# Patient Record
Sex: FEMALE | Race: WHITE | NOT HISPANIC OR LATINO | Employment: FULL TIME | URBAN - METROPOLITAN AREA
[De-identification: names, ages, dates, MRNs, and addresses within clinical notes are randomized per-mention and may not be internally consistent; named-entity substitution may affect disease eponyms.]

---

## 2016-06-02 LAB — EXTERNAL URINE DRUG SCREEN: NEGATIVE

## 2016-06-24 LAB — EXTERNAL CHLAMYDIA SCREEN: NORMAL

## 2017-01-05 ENCOUNTER — ROUTINE PRENATAL (OUTPATIENT)
Dept: OBSTETRICS AND GYNECOLOGY | Facility: CLINIC | Age: 22
End: 2017-01-05

## 2017-01-05 VITALS — BODY MASS INDEX: 31.79 KG/M2 | SYSTOLIC BLOOD PRESSURE: 110 MMHG | WEIGHT: 203 LBS | DIASTOLIC BLOOD PRESSURE: 72 MMHG

## 2017-01-05 DIAGNOSIS — O09.91 HIGH-RISK PREGNANCY IN FIRST TRIMESTER: Primary | ICD-10-CM

## 2017-01-05 DIAGNOSIS — E05.90 HYPERTHYROIDISM AFFECTING PREGNANCY: ICD-10-CM

## 2017-01-05 DIAGNOSIS — O99.280 HYPERTHYROIDISM AFFECTING PREGNANCY: ICD-10-CM

## 2017-01-05 PROCEDURE — 0502F SUBSEQUENT PRENATAL CARE: CPT | Performed by: OBSTETRICS & GYNECOLOGY

## 2017-01-05 RX ORDER — PSEUDOEPHEDRINE HCL 120 MG/1
120 TABLET, FILM COATED, EXTENDED RELEASE ORAL EVERY 12 HOURS
COMMUNITY
End: 2017-01-12

## 2017-01-05 NOTE — PROGRESS NOTES
Chief Complaint   Patient presents with   • Routine Prenatal Visit       HPI: Alexandria is a  currently at 36w0d who today reports the following:  Contractions - YES - but less than 4/hour AND no associated change in vaginal discharge; Leaking - No; Vaginal bleeding -  No; Swelling of extremities - No.    ROS:  Vitals: See prenatal flowsheet   GI: Nausea - No; Constipation - No; Diarrhea - No    Neuro: Headache - No; Visual change - No      EXAM:  Abdomen: See prenatal flowsheet   Urine glucose/protein: See prenatal flowsheet   Pelvic: See prenatal flowsheet   MDM:  Impression: 1. Supervision of pregnancy  2. Thyroid disease in pregnacy   Tests done today: GBS testing   Topics discussed: none - she had no major complaints,questions or concerns   Tests next visit: none   Next visit: See prenatal flowsheet

## 2017-01-05 NOTE — MR AVS SNAPSHOT
Alexandria Stock   2017 2:50 PM   Routine Prenatal    Dept Phone:  [phone]   Encounter #:  [id number]    Provider:  Pam Quezada DO   Department:  [facility] WOMEN'S CARE [place]                Your Full Care Plan              Your Updated Medication List          This list is accurate as of: 17  3:30 PM.  Always use your most recent med list.                acetaminophen 325 MG tablet   Commonly known as:  TYLENOL       ferrous sulfate 325 (65 FE) MG tablet   Take 1 tablet by mouth 2 (Two) Times a Day.       prenatal (CLASSIC) vitamin 28-0.8 MG tablet tablet       pseudoephedrine 120 MG 12 hr tablet   Commonly known as:  SUDAFED               We Performed the Following     Strep B Screen       You Were Diagnosed With        Codes Comments    High-risk pregnancy in first trimester    -  Primary ICD-10-CM: O09.91  ICD-9-CM: V23.9     Hyperthyroidism affecting pregnancy     ICD-10-CM: O99.280, E05.90  ICD-9-CM: 648.10, 242.90       Instructions     None    Patient Instructions History      Upcoming Appointments     Visit Type Date Time Department    OB FOLLOWUP 2017  2:50 PM [facility]    FOLLOW UP 3/8/2017  3:00 PM [facility] END [facility]      MyChart Signup     [facility] YouAre.TV allows you to send messages to your doctor, view your test results, renew your prescriptions, schedule appointments, and more. To sign up, go to [employer] and click on the Sign Up Now link in the New User? box. Enter your YouAre.TV Activation Code exactly as it appears below along with the last four digits of your Social Security Number and your Date of Birth () to complete the sign-up process. If you do not sign up before the expiration date, you must request a new code.    YouAre.TV Activation Code: [email or web address]  Expires: 2017  2:04 PM    If you have questions, you can email [email or web address] or call [phone] to talk to our  MyChart staff. Remember, MyChart is NOT to be used for urgent needs. For medical emergencies, dial 911.               Other Info from Your Visit           Your Appointments     Mar 08, 2017  3:00 PM EST   Follow Up with Evelin Mahmood MD   [facility] INTERNAL MEDICINE AND ENDOCRINOLOGY (--)    [address] [address]   [phone]           Arrive 15 minutes prior to appointment.              Allergies     Keflex [Cephalexin]  Hives      Reason for Visit     Routine Prenatal Visit           Vital Signs     Blood Pressure Weight Last Menstrual Period Body Mass Index Smoking Status       110/72 203 lb (92.1 kg) 04/28/2016 (Exact Date) 31.79 kg/m2 Never Smoker       Problems and Diagnoses Noted     High-risk pregnancy in first trimester    Hyperthyroidism complicating pregnancy

## 2017-01-12 ENCOUNTER — ROUTINE PRENATAL (OUTPATIENT)
Dept: OBSTETRICS AND GYNECOLOGY | Facility: CLINIC | Age: 22
End: 2017-01-12

## 2017-01-12 VITALS — DIASTOLIC BLOOD PRESSURE: 80 MMHG | BODY MASS INDEX: 31.95 KG/M2 | SYSTOLIC BLOOD PRESSURE: 120 MMHG | WEIGHT: 204 LBS

## 2017-01-12 DIAGNOSIS — O99.019 ANEMIA AFFECTING PREGNANCY: ICD-10-CM

## 2017-01-12 DIAGNOSIS — O99.280 HYPERTHYROIDISM AFFECTING PREGNANCY: ICD-10-CM

## 2017-01-12 DIAGNOSIS — F32.A DEPRESSION AFFECTING PREGNANCY IN SECOND TRIMESTER, ANTEPARTUM: ICD-10-CM

## 2017-01-12 DIAGNOSIS — O99.342 DEPRESSION AFFECTING PREGNANCY IN SECOND TRIMESTER, ANTEPARTUM: ICD-10-CM

## 2017-01-12 DIAGNOSIS — O09.91 HIGH-RISK PREGNANCY IN FIRST TRIMESTER: Primary | ICD-10-CM

## 2017-01-12 DIAGNOSIS — E05.90 HYPERTHYROIDISM AFFECTING PREGNANCY: ICD-10-CM

## 2017-01-12 PROCEDURE — 0502F SUBSEQUENT PRENATAL CARE: CPT | Performed by: OBSTETRICS & GYNECOLOGY

## 2017-01-12 NOTE — PROGRESS NOTES
Chief Complaint   Patient presents with   • Routine Prenatal Visit       HPI: Alexandria is a  currently at 37w0d who today reports the following:  Contractions - YES - but less than 4/hour AND no associated change in vaginal discharge; Leaking - No; Vaginal bleeding -  No; Swelling of extremities - No.    ROS:  Vitals: See prenatal flowsheet   GI: Nausea - No; Constipation - No; Diarrhea - No    Neuro: Headache - No; Visual change - No      EXAM:  Abdomen: See prenatal flowsheet   Urine glucose/protein: See prenatal flowsheet   Pelvic: See prenatal flowsheet   MDM:  Impression: 1. Supervision of pregnancy  2. Thyroid disease in pregnacy   Tests done today: none   Topics discussed: none - she had no major complaints,questions or concerns; she requests elective induction of labor   Tests next visit: none   Next visit: See prenatal flowsheet

## 2017-01-12 NOTE — MR AVS SNAPSHOT
Alexandria Stock   2017 2:30 PM   Routine Prenatal    Dept Phone:  [phone]   Encounter #:  [id number]    Provider:  Pam Quezada DO   Department:  [facility] WOMEN'S CARE [place]                Your Full Care Plan              Today's Medication Changes          These changes are accurate as of: 17  3:02 PM.  If you have any questions, ask your nurse or doctor.               Stop taking medication(s)listed here:     pseudoephedrine 120 MG 12 hr tablet   Commonly known as:  SUDAFED                      Your Updated Medication List          This list is accurate as of: 17  3:02 PM.  Always use your most recent med list.                acetaminophen 325 MG tablet   Commonly known as:  TYLENOL       ferrous sulfate 325 (65 FE) MG tablet   Take 1 tablet by mouth 2 (Two) Times a Day.       prenatal (CLASSIC) vitamin 28-0.8 MG tablet tablet               Instructions     None    Patient Instructions History      Upcoming Appointments     Visit Type Date Time Department    OB FOLLOWUP 2017  2:30 PM [facility]    FOLLOW UP 3/8/2017  3:00 PM [facility] END [facility]      [employer] Signup     [facility] [employer] allows you to send messages to your doctor, view your test results, renew your prescriptions, schedule appointments, and more. To sign up, go to [employer] and click on the Sign Up Now link in the New User? box. Enter your Dumbstruck Activation Code exactly as it appears below along with the last four digits of your Social Security Number and your Date of Birth () to complete the sign-up process. If you do not sign up before the expiration date, you must request a new code.    Dumb[employer]ck Activation Code: [email or web address]  Expires: 2017  3:02 PM    If you have questions, you can email [email or web address] or call [phone] to talk to our Dumbstruck staff. Remember, Dumbstruck is NOT to be used for urgent needs. For medical  emergencies, dial 911.               Other Info from Your Visit           Your Appointments     Mar 08, 2017  3:00 PM EST   Follow Up with Evelin Mahmood MD   [facility] INTERNAL MEDICINE AND ENDOCRINOLOGY (--)    [address] [place] [address] [phone]           Arrive 15 minutes prior to appointment.              Allergies     Keflex [Cephalexin]  Hives      Reason for Visit     Routine Prenatal Visit           Vital Signs     Blood Pressure Weight Last Menstrual Period Body Mass Index Smoking Status       120/80 204 lb (92.5 kg) 04/28/2016 (Exact Date) 31.95 kg/m2 Never Smoker

## 2017-01-20 ENCOUNTER — ROUTINE PRENATAL (OUTPATIENT)
Dept: OBSTETRICS AND GYNECOLOGY | Facility: CLINIC | Age: 22
End: 2017-01-20

## 2017-01-20 VITALS — BODY MASS INDEX: 32.26 KG/M2 | DIASTOLIC BLOOD PRESSURE: 74 MMHG | WEIGHT: 206 LBS | SYSTOLIC BLOOD PRESSURE: 118 MMHG

## 2017-01-20 DIAGNOSIS — F32.A DEPRESSION AFFECTING PREGNANCY IN SECOND TRIMESTER, ANTEPARTUM: ICD-10-CM

## 2017-01-20 DIAGNOSIS — O99.019 ANEMIA AFFECTING PREGNANCY: ICD-10-CM

## 2017-01-20 DIAGNOSIS — O99.342 DEPRESSION AFFECTING PREGNANCY IN SECOND TRIMESTER, ANTEPARTUM: ICD-10-CM

## 2017-01-20 DIAGNOSIS — O09.91 HIGH-RISK PREGNANCY IN FIRST TRIMESTER: Primary | ICD-10-CM

## 2017-01-20 DIAGNOSIS — O99.280 HYPERTHYROIDISM AFFECTING PREGNANCY: ICD-10-CM

## 2017-01-20 DIAGNOSIS — E05.90 HYPERTHYROIDISM AFFECTING PREGNANCY: ICD-10-CM

## 2017-01-20 PROCEDURE — 0502F SUBSEQUENT PRENATAL CARE: CPT | Performed by: OBSTETRICS & GYNECOLOGY

## 2017-01-20 RX ORDER — ONDANSETRON 4 MG/1
4 TABLET, FILM COATED ORAL EVERY 6 HOURS PRN
Status: CANCELLED | OUTPATIENT
Start: 2017-01-20

## 2017-01-20 RX ORDER — BUTORPHANOL TARTRATE 1 MG/ML
1 INJECTION, SOLUTION INTRAMUSCULAR; INTRAVENOUS
Status: CANCELLED | OUTPATIENT
Start: 2017-01-20

## 2017-01-20 RX ORDER — ONDANSETRON 2 MG/ML
4 INJECTION INTRAMUSCULAR; INTRAVENOUS EVERY 6 HOURS PRN
Status: CANCELLED | OUTPATIENT
Start: 2017-01-20

## 2017-01-20 RX ORDER — OXYTOCIN/RINGER'S LACTATE 30/500 ML
2-24 PLASTIC BAG, INJECTION (ML) INTRAVENOUS
Status: CANCELLED | OUTPATIENT
Start: 2017-01-27

## 2017-01-20 RX ORDER — OXYTOCIN/RINGER'S LACTATE 20/1000 ML
125 PLASTIC BAG, INJECTION (ML) INTRAVENOUS AS NEEDED
Status: CANCELLED | OUTPATIENT
Start: 2017-01-20 | End: 2017-01-21

## 2017-01-20 RX ORDER — PROMETHAZINE HYDROCHLORIDE 25 MG/ML
12.5 INJECTION, SOLUTION INTRAMUSCULAR; INTRAVENOUS EVERY 6 HOURS PRN
Status: CANCELLED | OUTPATIENT
Start: 2017-01-20

## 2017-01-20 RX ORDER — LIDOCAINE HYDROCHLORIDE 10 MG/ML
5 INJECTION, SOLUTION INFILTRATION; PERINEURAL AS NEEDED
Status: CANCELLED | OUTPATIENT
Start: 2017-01-20

## 2017-01-20 RX ORDER — METHYLERGONOVINE MALEATE 0.2 MG/ML
200 INJECTION INTRAVENOUS ONCE AS NEEDED
Status: CANCELLED | OUTPATIENT
Start: 2017-01-20

## 2017-01-20 RX ORDER — CARBOPROST TROMETHAMINE 250 UG/ML
250 INJECTION, SOLUTION INTRAMUSCULAR AS NEEDED
Status: CANCELLED | OUTPATIENT
Start: 2017-01-20

## 2017-01-20 RX ORDER — BUTORPHANOL TARTRATE 1 MG/ML
2 INJECTION, SOLUTION INTRAMUSCULAR; INTRAVENOUS
Status: CANCELLED | OUTPATIENT
Start: 2017-01-20

## 2017-01-20 RX ORDER — SODIUM CHLORIDE 0.9 % (FLUSH) 0.9 %
1-10 SYRINGE (ML) INJECTION AS NEEDED
Status: CANCELLED | OUTPATIENT
Start: 2017-01-20

## 2017-01-20 RX ORDER — OXYTOCIN/RINGER'S LACTATE 20/1000 ML
999 PLASTIC BAG, INJECTION (ML) INTRAVENOUS ONCE
Status: CANCELLED | OUTPATIENT
Start: 2017-01-20 | End: 2017-01-20

## 2017-01-20 RX ORDER — PROMETHAZINE HYDROCHLORIDE 25 MG/1
12.5 TABLET ORAL EVERY 6 HOURS PRN
Status: CANCELLED | OUTPATIENT
Start: 2017-01-20

## 2017-01-20 RX ORDER — TERBUTALINE SULFATE 1 MG/ML
0.25 INJECTION, SOLUTION SUBCUTANEOUS AS NEEDED
Status: CANCELLED | OUTPATIENT
Start: 2017-01-20

## 2017-01-20 RX ORDER — SODIUM CHLORIDE, SODIUM LACTATE, POTASSIUM CHLORIDE, CALCIUM CHLORIDE 600; 310; 30; 20 MG/100ML; MG/100ML; MG/100ML; MG/100ML
125 INJECTION, SOLUTION INTRAVENOUS CONTINUOUS
Status: CANCELLED | OUTPATIENT
Start: 2017-01-20

## 2017-01-20 RX ORDER — MISOPROSTOL 100 UG/1
800 TABLET ORAL AS NEEDED
Status: CANCELLED | OUTPATIENT
Start: 2017-01-20

## 2017-01-20 RX ORDER — PROMETHAZINE HYDROCHLORIDE 25 MG/1
12.5 SUPPOSITORY RECTAL EVERY 6 HOURS PRN
Status: CANCELLED | OUTPATIENT
Start: 2017-01-20

## 2017-01-20 NOTE — H&P
[place]  : 1995  MRN: [id number]  CSN: [id number]    History and Physical    Subjective   Alexandria Stock is a 21 y.o. year old  with an Estimated Date of Delivery: 17 presenting for induction of labor for elective at term per patient request and prior precipitous labor.    Risks of labor induction including prolongation of labor, increased risks for both  section and operative vaginal birth have been discussed at length.     Prenatal care has been with Dr. Quezada.  It has been complicated by hyperthyroid.    Obstetric History       T2      TAB0   SAB0   E0   M0   L2       # Outcome Date GA Lbr Eric/2nd Weight Sex Delivery Anes PTL Lv   3 Current            2 Term 04/10/14 39w0d  8 lb 6 oz (3.799 kg) M Vag-Spont   Y      Name: Trae   1 Term 08/27/10 38w0d  6 lb 2 oz (2.778 kg) F Vag-Spont   Y      Name: Jigar      Complications: Gestational hypertension        Past Medical History   Diagnosis Date   • Anxiety    • Asthma    • Depression    • Hypertension      GHTN with 1st pregnancy   • Subclinical hyperthyroidism 2016     Past Surgical History   Procedure Laterality Date   • [place] tooth extraction         Current Outpatient Prescriptions:   •  acetaminophen (TYLENOL) 325 MG tablet, Take 650 mg by mouth as needed for mild pain (1-3)., Disp: , Rfl:   •  ferrous sulfate 325 (65 FE) MG tablet, Take 1 tablet by mouth 2 (Two) Times a Day., Disp: 60 tablet, Rfl: 3  •  Prenatal Vit-Fe Fumarate-FA (PRENATAL, CLASSIC, VITAMIN) 28-0.8 MG tablet tablet, Take 1 tablet by mouth daily., Disp: , Rfl:     Allergies   Allergen Reactions   • Keflex [Cephalexin] Hives     Smoking status: Never Smoker                                                              Smokeless status: Never Used                        Review of Systems   Constitutional: Negative.    Genitourinary: Negative.    Psychiatric/Behavioral: Negative.          Objective   Visit Vitals   • /74   • Wt  206 lb (93.4 kg)   • LMP 04/28/2016 (Exact Date)   • BMI 32.26 kg/m2       General: well developed; well nourished  no acute distress  appears stated age   Abdomen: soft, NT, gravid   Cervix: 2 cm / 50 % / -2 / firm / posterior   Presentation: cephalic     Prenatal Labs  Lab Results   Component Value Date    HGB 10.1 (L) 10/21/2016    RUBELLAIGGIN Immune 06/02/2016    HEPBSAG NonReactive 06/02/2016    LABRPR Non-reactive 06/02/2016    ABORH O Rh Positive 06/02/2016    ABSCRN Negative 10/21/2016    LABANTI Negative 06/02/2016    CQOWMBI74 NonReactive 06/02/2016    YWESWNF0SI 56 (L) 10/21/2016    GBSANTIGEN Negative 01/05/2017       Current Labs Reviewed   No data reviewed       Assessment   1. IUP with an Estimated Date of Delivery: 2/2/17  2. Induction of labor because of elective at term per patient request and prior precipitous labor  3. Group B strep status: negative     Plan   1. Transcervical Nunes bulb/Pitocin induction  2. Amniotomy with descent of presenting part      Pam Quezada DO  1/20/2017  11:29 AM

## 2017-01-20 NOTE — PROGRESS NOTES
Chief Complaint   Patient presents with   • Routine Prenatal Visit       HPI: Alexandria is a  currently at 38w1d who today reports the following:  Contractions - YES - but less than 4/hour AND no associated change in vaginal discharge; Leaking - No; Vaginal bleeding -  No; Swelling of extremities - No.    ROS:  GI: Nausea - No; Constipation - No; Diarrhea - No    Neuro: Headache - No; Visual change - No      EXAM:  Vitals: See prenatal flowsheet   Abdomen: See prenatal flowsheet   Urine glucose/protein: See prenatal flowsheet   Pelvic: See prenatal flowsheet   MDM:  Impression: 1. Supervision of pregnancy  2. Thyroid disease in pregnacy   Tests done today: none   Topics discussed: 1. Continue with PNV's  2. Prenatal labs reviewed  3. induction of labor   Tests next visit: none   Next visit: See prenatal flowsheet

## 2017-01-20 NOTE — MR AVS SNAPSHOT
Alexandria Stock   2017 10:50 AM   Routine Prenatal    Dept Phone:  [phone]   Encounter #:  [id number]    Provider:  Pam Quezada DO   Department:  [facility] WOMEN'S CARE [place]                Your Full Care Plan              Your Updated Medication List          This list is accurate as of: 17 11:32 AM.  Always use your most recent med list.                acetaminophen 325 MG tablet   Commonly known as:  TYLENOL       ferrous sulfate 325 (65 FE) MG tablet   Take 1 tablet by mouth 2 (Two) Times a Day.       prenatal (CLASSIC) vitamin 28-0.8 MG tablet tablet               You Were Diagnosed With        Codes Comments    High-risk pregnancy in first trimester    -  Primary ICD-10-CM: O09.91  ICD-9-CM: V23.9     Hyperthyroidism affecting pregnancy     ICD-10-CM: O99.280, E05.90  ICD-9-CM: 648.10, 242.90     Depression affecting pregnancy in second trimester, antepartum     ICD-10-CM: O99.342, F32.9  ICD-9-CM: 648.43, 311     Anemia affecting pregnancy     ICD-10-CM: O99.019  ICD-9-CM: 648.20, 285.9       Instructions     None    Patient Instructions History      Upcoming Appointments     Visit Type Date Time Department    OB FOLLOWUP 2017 10:50 AM [facility]    LABOR INDUCTION 2017  9:30 PM  [facility] L AND D PROCED    FOLLOW UP 3/8/2017  3:00 PM [facility] END [facility]      [facility] Signup     [facility] for[MD] allows you to send messages to your doctor, view your test results, renew your prescriptions, schedule appointments, and more. To sign up, go to [employer] and click on the Sign Up Now link in the New User? box. Enter your for[MD] Activation Code exactly as it appears below along with the last four digits of your Social Security Number and your Date of Birth () to complete the sign-up process. If you do not sign up before the expiration date, you must request a new code.    for[MD] Activation Code:  [email or web address]  Expires: 1/26/2017  3:02 PM    If you have questions, you can email [email or web address] or call [phone] to talk to our [employer] staff. Remember, Whistlehart is NOT to be used for urgent needs. For medical emergencies, dial 911.               Other Info from Your Visit           Your Appointments     Jan 26, 2017  9:30 PM EST   LABOR INDUCTION with [facility]  INDUCTION ROOM 1   [facility] LABOR AND DELIVERY PROCEDURES ([place])    [address] [place] [address] [phone]            Mar 08, 2017  3:00 PM EST   Follow Up with Evelin Mahmood MD   [facility] INTERNAL MEDICINE AND ENDOCRINOLOGY (--)    [address] [place] [address] [phone]           Arrive 15 minutes prior to appointment.              Allergies     Keflex [Cephalexin]  Hives      Reason for Visit     Routine Prenatal Visit           Vital Signs     Blood Pressure Weight Last Menstrual Period Body Mass Index Smoking Status       118/74 206 lb (93.4 kg) 04/28/2016 (Exact Date) 32.26 kg/m2 Never Smoker       Problems and Diagnoses Noted     High-risk pregnancy in first trimester    Anemia affecting pregnancy    Depression affecting pregnancy in second trimester, antepartum    Hyperthyroidism complicating pregnancy

## 2017-01-23 ENCOUNTER — HOSPITAL ENCOUNTER (OUTPATIENT)
Facility: HOSPITAL | Age: 22
Setting detail: OBSERVATION
Discharge: HOME | End: 2017-01-23
Attending: OBSTETRICS & GYNECOLOGY | Admitting: OBSTETRICS & GYNECOLOGY

## 2017-01-23 ENCOUNTER — HOSPITAL ENCOUNTER (OUTPATIENT)
Facility: HOSPITAL | Age: 22
End: 2017-01-23
Attending: OBSTETRICS & GYNECOLOGY | Admitting: OBSTETRICS & GYNECOLOGY

## 2017-01-23 VITALS
DIASTOLIC BLOOD PRESSURE: 76 MMHG | WEIGHT: 206 LBS | SYSTOLIC BLOOD PRESSURE: 114 MMHG | TEMPERATURE: 98.1 F | RESPIRATION RATE: 16 BRPM | HEIGHT: 67 IN | HEART RATE: 109 BPM | BODY MASS INDEX: 32.33 KG/M2

## 2017-01-23 PROBLEM — O47.1 FALSE LABOR AFTER 37 WEEKS OF GESTATION WITHOUT DELIVERY: Status: ACTIVE | Noted: 2017-01-23

## 2017-01-23 PROBLEM — Z37.9 NORMAL LABOR: Status: ACTIVE | Noted: 2017-01-23

## 2017-01-23 PROBLEM — Z37.9 NORMAL LABOR: Status: RESOLVED | Noted: 2017-01-23 | Resolved: 2017-01-23

## 2017-01-23 LAB
ABO GROUP BLD: NORMAL
ALP SERPL-CCNC: 179 U/L (ref 25–100)
ALT SERPL W P-5'-P-CCNC: 22 U/L (ref 7–40)
AST SERPL-CCNC: 26 U/L (ref 0–33)
BACTERIA UR QL AUTO: ABNORMAL /HPF
BILIRUB SERPL-MCNC: 0.6 MG/DL (ref 0.3–1.2)
BILIRUB UR QL STRIP: ABNORMAL
BLD GP AB SCN SERPL QL: NEGATIVE
CLARITY UR: ABNORMAL
COLOR UR: ABNORMAL
CREAT BLD-MCNC: 0.6 MG/DL (ref 0.6–1.3)
DEPRECATED RDW RBC AUTO: 41.6 FL (ref 37–54)
ERYTHROCYTE [DISTWIDTH] IN BLOOD BY AUTOMATED COUNT: 13.6 % (ref 11.3–14.5)
GLUCOSE UR STRIP-MCNC: NEGATIVE MG/DL
HCT VFR BLD AUTO: 29.3 % (ref 34.5–44)
HGB BLD-MCNC: 9.6 G/DL (ref 11.5–15.5)
HGB UR QL STRIP.AUTO: NEGATIVE
HYALINE CASTS UR QL AUTO: ABNORMAL /LPF
KETONES UR QL STRIP: ABNORMAL
LDH SERPL-CCNC: 160 U/L (ref 120–246)
LEUKOCYTE ESTERASE UR QL STRIP.AUTO: ABNORMAL
MCH RBC QN AUTO: 27.1 PG (ref 27–31)
MCHC RBC AUTO-ENTMCNC: 32.8 G/DL (ref 32–36)
MCV RBC AUTO: 82.8 FL (ref 80–99)
NITRITE UR QL STRIP: NEGATIVE
PH UR STRIP.AUTO: 6.5 [PH] (ref 5–8)
PLATELET # BLD AUTO: 139 10*3/MM3 (ref 150–450)
PMV BLD AUTO: 10.3 FL (ref 6–12)
PROT UR QL STRIP: NEGATIVE
RBC # BLD AUTO: 3.54 10*6/MM3 (ref 3.89–5.14)
RBC # UR: ABNORMAL /HPF
REF LAB TEST METHOD: ABNORMAL
RH BLD: POSITIVE
SP GR UR STRIP: 1.02 (ref 1–1.03)
SQUAMOUS #/AREA URNS HPF: ABNORMAL /HPF
URATE SERPL-MCNC: 4.3 MG/DL (ref 3.1–7.8)
UROBILINOGEN UR QL STRIP: ABNORMAL
WBC NRBC COR # BLD: 8.38 10*3/MM3 (ref 3.5–10.8)
WBC UR QL AUTO: ABNORMAL /HPF

## 2017-01-23 PROCEDURE — 82565 ASSAY OF CREATININE: CPT | Performed by: OBSTETRICS & GYNECOLOGY

## 2017-01-23 PROCEDURE — 84075 ASSAY ALKALINE PHOSPHATASE: CPT | Performed by: OBSTETRICS & GYNECOLOGY

## 2017-01-23 PROCEDURE — 84550 ASSAY OF BLOOD/URIC ACID: CPT | Performed by: OBSTETRICS & GYNECOLOGY

## 2017-01-23 PROCEDURE — 86850 RBC ANTIBODY SCREEN: CPT

## 2017-01-23 PROCEDURE — 59025 FETAL NON-STRESS TEST: CPT | Performed by: OBSTETRICS & GYNECOLOGY

## 2017-01-23 PROCEDURE — 86900 BLOOD TYPING SEROLOGIC ABO: CPT

## 2017-01-23 PROCEDURE — 84450 TRANSFERASE (AST) (SGOT): CPT | Performed by: OBSTETRICS & GYNECOLOGY

## 2017-01-23 PROCEDURE — G0378 HOSPITAL OBSERVATION PER HR: HCPCS

## 2017-01-23 PROCEDURE — 99235 HOSP IP/OBS SAME DATE MOD 70: CPT | Performed by: OBSTETRICS & GYNECOLOGY

## 2017-01-23 PROCEDURE — 99218 PR INITIAL OBSERVATION CARE/DAY 30 MINUTES: CPT | Performed by: OBSTETRICS & GYNECOLOGY

## 2017-01-23 PROCEDURE — 82247 BILIRUBIN TOTAL: CPT | Performed by: OBSTETRICS & GYNECOLOGY

## 2017-01-23 PROCEDURE — 85027 COMPLETE CBC AUTOMATED: CPT | Performed by: OBSTETRICS & GYNECOLOGY

## 2017-01-23 PROCEDURE — 63710000001 PROMETHAZINE PER 25 MG: Performed by: OBSTETRICS & GYNECOLOGY

## 2017-01-23 PROCEDURE — 81001 URINALYSIS AUTO W/SCOPE: CPT | Performed by: OBSTETRICS & GYNECOLOGY

## 2017-01-23 PROCEDURE — 96372 THER/PROPH/DIAG INJ SC/IM: CPT

## 2017-01-23 PROCEDURE — 86901 BLOOD TYPING SEROLOGIC RH(D): CPT

## 2017-01-23 PROCEDURE — 84460 ALANINE AMINO (ALT) (SGPT): CPT | Performed by: OBSTETRICS & GYNECOLOGY

## 2017-01-23 PROCEDURE — 83615 LACTATE (LD) (LDH) ENZYME: CPT | Performed by: OBSTETRICS & GYNECOLOGY

## 2017-01-23 PROCEDURE — 25010000002 MORPHINE PER 10 MG: Performed by: OBSTETRICS & GYNECOLOGY

## 2017-01-23 RX ORDER — SODIUM PHOSPHATE, DIBASIC AND SODIUM PHOSPHATE, MONOBASIC 7; 19 G/133ML; G/133ML
1 ENEMA RECTAL ONCE
Status: COMPLETED | OUTPATIENT
Start: 2017-01-23 | End: 2017-01-23

## 2017-01-23 RX ORDER — MORPHINE SULFATE 4 MG/ML
10 INJECTION, SOLUTION INTRAMUSCULAR; INTRAVENOUS ONCE
Status: COMPLETED | OUTPATIENT
Start: 2017-01-23 | End: 2017-01-23

## 2017-01-23 RX ORDER — PROMETHAZINE HYDROCHLORIDE 25 MG/1
25 TABLET ORAL ONCE
Status: COMPLETED | OUTPATIENT
Start: 2017-01-23 | End: 2017-01-23

## 2017-01-23 RX ADMIN — SODIUM PHOSPHATE, DIBASIC AND SODIUM PHOSPHATE, MONOBASIC 1 ENEMA: 7; 19 ENEMA RECTAL at 10:45

## 2017-01-23 RX ADMIN — PROMETHAZINE HYDROCHLORIDE 25 MG: 25 TABLET ORAL at 10:44

## 2017-01-23 RX ADMIN — MORPHINE SULFATE 10 MG: 4 INJECTION INTRAVENOUS at 10:45

## 2017-01-23 RX ADMIN — SODIUM CITRATE AND CITRIC ACID MONOHYDRATE 30 ML: 500; 334 SOLUTION ORAL at 08:29

## 2017-01-23 NOTE — NURSING NOTE
D/c instructions reviewed with pt and pt vu and in agreement with poc. Pt d.c home in private vehicle with FOB. Pt to keep f/u appt.

## 2017-01-23 NOTE — IP AVS SNAPSHOT
AFTER VISIT SUMMARY             Alexandria Stock           About your hospitalization     You were discharged on:  January 23, 2017 You last received care in the:  [facility] LABOR DELIVERY     Unit phone number:  [phone]       Medications    If you or your caregiver advised us that you are currently taking a medication and that medication is marked below as “Resume”, this simply indicates that we have reviewed those medications to make sure our new therapy recommendations do not interfere.  If you have concerns about medications other than those new ones which we are prescribing today, please consult the physician who prescribed them (or your primary physician).  Our review of your home medications is not meant to indicate that we are directing their use.             Your Medications      ASK your doctor about these medications           Morning Noon Evening Bedtime As Needed    acetaminophen 325 MG tablet   Take 650 mg by mouth as needed for mild pain (1-3).   Commonly known as:  TYLENOL                                ferrous sulfate 325 (65 FE) MG tablet   Take 1 tablet by mouth 2 (Two) Times a Day.                                prenatal (CLASSIC) vitamin 28-0.8 MG tablet tablet   Take 1 tablet by mouth daily.                                         Instructions for After Discharge        Discharge References/Attachments     REJI LOPEZ CONTRACTIONS (ENGLISH)    NONSTRESS TEST (ENGLISH)    CONSTIPATION, ADULT, EASY-TO-READ (ENGLISH)      Scheduled Appointments     Jan 26, 2017  9:30 PM EST   LABOR INDUCTION with TOMY  INDUCTION ROOM 1   [facility] LABOR AND DELIVERY PROCEDURES ([place])    [address] [place] [address] [phone]            Mar 08, 2017  3:00 PM EST   Follow Up with Evelin Mahmood MD   [facility]OCRINOLOGY (--)    [address] [place] [id number] [phone]           Arrive 15 minutes prior to appointment.               Relevant Prenatal Information          Facts About Your Prenatal Visit (All Dating Information Is Approximate)     Due Date How Far Along Am I? Pregnancy Weight Gain Weight Gain Since Prior Visit (2017)    2017 38 weeks 4 days 32 lb (14.5 kg) 0 lb (0 kg)      Vitals     Blood Pressure Weight                114/76 206 lb (93.4 kg)           Summary of Your Hospitalization        Reason for Hospitalization     Your primary diagnosis was:  Not on File    Your diagnoses also included:  Normal Labor      Care Providers     Provider Service Role Specialty    Pam Quezada DO -- Attending Provider Obstetrics and Gynecology      Your Allergies  Date Reviewed: 2017    Allergen Reactions    Keflex (Cephalexin) Hives      [employer] Signup     [facility] [employer] allows you to send messages to your doctor, view your test results, renew your prescriptions, schedule appointments, and more. To sign up, go to [employer] and click on the Sign Up Now link in the New User? box. Enter your [employer] Activation Code exactly as it appears below along with the last four digits of your Social Security Number and your Date of Birth () to complete the sign-up process. If you do not sign up before the expiration date, you must request a new code.    [employer] Activation Code: [email or web address]  Expires: 2017  3:02 PM    If you have questions, you can email [email or web address] or call [phone] to talk to our [employer] staff. Remember, [employer] is NOT to be used for urgent needs. For medical emergencies, dial 911.          Information Regarding Fetal Kick Counts     Fetal Movement Counts  Patient Name: __________________________________________________   Patient Due Date: ____________________  Performing a fetal movement count is highly recommended in high-risk pregnancies, but it is good for every pregnant woman to do. Your caregiver may  ask you to start counting fetal movements at 28 weeks of the pregnancy. Fetal movements often increase:  · After eating a full meal.  · After physical activity.  · After eating or drinking something sweet or cold.  · At rest.  Pay attention to when you feel the baby is most active. This will help you notice a pattern of your baby's sleep and wake cycles and what factors contribute to an increase in fetal movement. It is important to perform a fetal movement count at the same time each day when your baby is normally most active.    HOW TO COUNT FETAL MOVEMENTS  1. Find a quiet and comfortable area to sit or lie down on your left side. Lying on your left side provides the best blood and oxygen circulation to your baby.  2. Write down the day and time on a sheet of paper or in a journal.  3. Start counting kicks, flutters, swishes, rolls, or jabs in a 2 hour period. You should feel at least 10 movements within 2 hours.  4. If you do not feel 10 movements in 2 hours, wait 2-3 hours and count again. Look for a change in the pattern or not enough counts in 2 hours.  SEEK MEDICAL CARE IF:  · You feel less than 10 counts in 2 hours, tried twice.  · There is no movement in over an hour.  · The pattern is changing or taking longer each day to reach 10 counts in 2 hours.  · You feel the baby is not moving as he or she usually does.    Date  Movements  Start Time  Finish Time    Date  Movements  Start Time  Finish Time    Date  Movements  Start Time  Finish Time    Date  Movements  Start Time  Finish Time    Date  Movements  Start Time  Finish Time    Date  Movements  Start Time  Finish Time      Date  Movements  Start Time  Finish Time    Date  Movements  Start Time  Finish Time    Date  Movements  Start Time  Finish Time    Date  Movements  Start Time  Finish Time    Date  Movements  Start Time  Finish Time    Date  Movements  Start Time  Finish Time      Date  Movements  Start Time  Finish Time    Date  Movements  Start Time   Finish Time    Date  Movements  Start Time  Finish Time    Date  Movements  Start Time  Finish Time    Date  Movements  Start Time  Finish Time    Date  Movements  Start Time  Finish Time    Date  Movements  Start Time  Finish Time    Date  Movements  Start Time  Finish Time    Date  Movements  Start Time  Finish Time    Date  Movements  Start Time  Finish Time    Date  Movements  Start Time  Finish Time    Date  Movements  Start Time  Finish Time      Document Released: 01/17/2008   Document Revised: 12/04/2013   Document Reviewed: 10/14/2013    ExitCare® Patient Information ©2015 [employer], [facility]. This information is not intended to replace advice given to you by your health care provider. Make sure you discuss any questions you have with your health care provider.            More Information      Reji Herrera Contractions  Contractions of the uterus can occur throughout pregnancy. Contractions are not always a sign that you are in labor.   WHAT ARE REJI HERRERA CONTRACTIONS?   Contractions that occur before labor are called Reji Herrera contractions, or false labor. Toward the end of pregnancy (32-34 weeks), these contractions can develop more often and may become more forceful. This is not true labor because these contractions do not result in opening (dilatation) and thinning of the cervix. They are sometimes difficult to tell apart from true labor because these contractions can be forceful and people have different pain tolerances. You should not feel embarrassed if you go to the hospital with false labor. Sometimes, the only way to tell if you are in true labor is for your health care provider to look for changes in the cervix.  If there are no prenatal problems or other health problems associated with the pregnancy, it is completely safe to be sent home with false labor and await the onset of true labor.  HOW CAN YOU TELL THE DIFFERENCE BETWEEN TRUE AND FALSE LABOR?  False Labor  · The contractions of false  labor are usually shorter and not as hard as those of true labor.    · The contractions are usually irregular.    · The contractions are often felt in the front of the lower abdomen and in the groin.    · The contractions may go away when you walk around or change positions while lying down.    · The contractions get weaker and are shorter lasting as time goes on.    · The contractions do not usually become progressively stronger, regular, and closer together as with true labor.    True Labor  · Contractions in true labor last 30-70 seconds, become very regular, usually become more intense, and increase in frequency.    · The contractions do not go away with walking.    · The discomfort is usually felt in the top of the uterus and spreads to the lower abdomen and low back.    · True labor can be determined by your health care provider with an exam. This will show that the cervix is dilating and getting thinner.    WHAT TO REMEMBER  · Keep up with your usual exercises and follow other instructions given by your health care provider.    · Take medicines as directed by your health care provider.    · Keep your regular prenatal appointments.    · Eat and drink lightly if you think you are going into labor.    · If Harnett Herrera contractions are making you uncomfortable:      Change your position from lying down or resting to walking, or from walking to resting.      Sit and rest in a tub of warm water.      Drink 2-3 glasses of water. Dehydration may cause these contractions.      Do slow and deep breathing several times an hour.    WHEN SHOULD I SEEK IMMEDIATE MEDICAL CARE?  Seek immediate medical care if:  · Your contractions become stronger, more regular, and closer together.    · You have fluid leaking or gushing from your vagina.    · You have a fever.    · You pass blood-tinged mucus.    · You have vaginal bleeding.    · You have continuous abdominal pain.    · You have low back pain that you never had before.     · You feel your baby's head pushing down and causing pelvic pressure.    · Your baby is not moving as much as it used to.       This information is not intended to replace advice given to you by your health care provider. Make sure you discuss any questions you have with your health care provider.     Document Released: 12/18/2006 Document Revised: 12/23/2014 Document Reviewed: 09/29/2014  [employer] Interactive Patient Education ©2016 [employer]          Nonstress Test  The nonstress test is a procedure that monitors the fetus's heartbeat. The test will monitor the heartbeat when the fetus is at rest and while the fetus is moving. In a healthy fetus, there will be an increase in fetal heart rate when the fetus moves or kicks. The heart rate will decrease at rest. This test helps determine if the fetus is healthy. Your health care provider will look at a number of patterns in the heart rate tracing to make sure your baby is thriving. If there is concern, your health care provider may order additional tests or may suggest another course of action. This test is often done in the third trimester and can help determine if an early delivery is needed and safe. Common reasons to have this test are:  · You are past your due date.  · You have a high-risk pregnancy.  · You are feeling less movement than normal.  · You have lost a pregnancy in the past.  · Your health care provider suspects fetal growth problems.  · You have too much or too little amniotic fluid.  BEFORE THE PROCEDURE  · Eat a meal right before the test or as directed by your health care provider. Food may help stimulate fetal movements.  · Use the restroom right before the test.  PROCEDURE  · Two belts will be placed around your abdomen. These belts have monitors attached to them. One records the fetal heart rate and the other records uterine contractions.  · You may be asked to lie down on your side or to stay sitting upright.  · You may be given a  button to press when you feel movement.  · The fetal heartbeat is listened to and watched on a screen. The heartbeat is recorded on a sheet of paper.  · If the fetus seems to be sleeping, you may be asked to drink some juice or soda, gently press your abdomen, or make some noise to wake the fetus.  AFTER THE PROCEDURE   Your health care provider will discuss the test results with you and make recommendations for the near future.     This information is not intended to replace advice given to you by your health care provider. Make sure you discuss any questions you have with your health care provider.     Document Released: 12/08/2003 Document Revised: 01/08/2016 Document Reviewed: 01/21/2014  [employer] Interactive Patient Education ©2016 [employer]          Constipation, Adult  Constipation is when a person:  · Poops (has a bowel movement) less than 3 times a week.  · Has a hard time pooping.  · Has poop that is dry, hard, or bigger than normal.  HOME CARE   · Eat foods with a lot of fiber in them. This includes fruits, vegetables, beans, and whole grains such as brown rice.  · Avoid fatty foods and foods with a lot of sugar. This includes french fries, hamburgers, cookies, candy, and soda.  · If you are not getting enough fiber from food, take products with added fiber in them (supplements).  · Drink enough fluid to keep your pee (urine) clear or pale yellow.  · Exercise on a regular basis, or as told by your doctor.  · Go to the restroom when you feel like you need to poop. Do not hold it.  · Only take medicine as told by your doctor. Do not take medicines that help you poop (laxatives) without talking to your doctor first.  GET HELP RIGHT AWAY IF:   · You have bright red blood in your poop (stool).  · Your constipation lasts more than 4 days or gets worse.  · You have belly (abdominal) or butt (rectal) pain.  · You have thin poop (as thin as a pencil).  · You lose weight, and it cannot be explained.  MAKE SURE  YOU:   · Understand these instructions.  · Will watch your condition.  · Will get help right away if you are not doing well or get worse.     This information is not intended to replace advice given to you by your health care provider. Make sure you discuss any questions you have with your health care provider.     Document Released: 06/05/2009 Document Revised: 01/08/2016 Document Reviewed: 09/29/2014  [employer] Interactive Patient Education ©2016 [employer] Inc.            SYMPTOMS OF A STROKE    Call 911 or have someone take you to the Emergency Department if you have any of the following:    · Sudden numbness or weakness of your face, arm or leg especially on one side of the body  · Sudden confusion, diffiiculty speaking or trouble understanding   · Changes in your vision or loss of sight in one eye  · Sudden severe headache with no known cause  · sudden dizziness, trouble walking, loss of balance or coordination    It is important to seek emergency care right away if you have further stroke symptoms. If you get emergency help quickly, the powerful clot-dissolving medicines can reduce the disabilities caused by a stroke.     For more information:    American Stroke Association  0-147-2-STROKE  www.strokeassociation.org           IF YOU SMOKE OR USE TOBACCO PLEASE READ THE FOLLOWING:    Why is smoking bad for me?  Smoking increases the risk of heart disease, lung disease, vascular disease, stroke, and cancer.     If you smoke, STOP!    If you would like more information on quitting smoking, please visit the [employer] website: [email or web address]-[email or web address]   This link will provide additional resources including the QUIT line and the [employer] support groups.     For more information:    American Cancer Society  (625) 515-4114    American Heart Association  1-468.403.5011               YOU ARE THE MOST IMPORTANT FACTOR IN YOUR RECOVERY.     Follow all instructions carefully.      I have reviewed my discharge instructions with my nurse, including the following information, if applicable:     Information about my illness and diagnosis   Follow up appointments (including lab draws)   Wound Care   Equipment Needs   Medications (new and continuing) along with side effects   Preventative information such as vaccines and smoking cessations   Diet   Pain   I know when to contact my Doctor's office or seek emergency care      I want my nurse to describe the side effects of my medications: YES NO   If the answer is no, I understand the side effects of my medications: YES NO   My nurse described the side effects of my medications in a way that I could understand: YES NO   I have taken my personal belongings and my own medications with me at discharge: YES NO            Should a home visit be schedule with you:  a notification from your provider will be made prior to the visit.  If you have any questions or concerns about the visit, contact your provider.      I have received this information and my questions have been answered. I have discussed any concerns I see with this plan with the nurse or physician. I understand these instructions.    Signature of Patient or Responsible Person: _____________________________________    Date: _________________  Time: __________________    Signature of Healthcare Provider: _______________________________________  Date: _________________  Time: __________________          Additional Information     I have reviewed the patient condition and status with the provider and a discharge order was obtained.  I hereby state that the patient has been examined and observed for a reasonable period of time and I certify that the patient is in false labor.    RN Name ___________________________________________    Date and Time _______________________________________

## 2017-01-23 NOTE — H&P
"Alexandria Stock  1995  [id number]  [id number]    CC: contractions  HPI:  Patient is 21 y.o. white female   currently at 38w4d  Presents with c/o uterine contractions.  Onset ~8pm, intermittent, rates 7/10, radiates to back, denies assoc bleeding or SROM.  Good FM.  PNC comp by hx hyperthyroisism (was taking methimazole but was discontinued by endocrinologist ~2 months ago).      PMH:   Current meds PNV, Fe, albuterol inhaler (hasn't needed to use in a long time)  Illnesses asthma, hyperthyroisism  Surgeries oral surg  Allergies keflex-hives    Past OB History:       Obstetric History       T2      TAB0   SAB0   E0   M0   L2       # Outcome Date GA Lbr Eric/2nd Weight Sex Delivery Anes PTL Lv   3 Current            2 Term 04/10/14 39w0d  8 lb 6 oz (3.799 kg) M Vag-Spont   Y      Name: Trae Magana Term 08/27/10 38w0d  6 lb 2 oz (2.778 kg) F Vag-Spont   Y      Name: Jigar      Complications: Gestational hypertension               SH: tob neg , EtOH neg, drugs neg  FH: heart dz pos , diabetes pos , cancer pos    General ROS: All systems reviewed and negative except for N,dysuria,tingling (legs), edema      Physical Examination: General appearance - alert, well appearing, and in no distress  Vital signs -   Visit Vitals   • /69   • Pulse 109   • Temp 98.4 °F (36.9 °C) (Oral)   • Resp 18   • Ht 67\" (170.2 cm)   • Wt 206 lb (93.4 kg)   • LMP 2016 (Exact Date)   • BMI 32.26 kg/m2     HEENT: normocephalic, atraumatic, pharynx clear   Neck - supple, no significant adenopathy  Lymphatics - no palpable lymphadenopathy, no hepatosplenomegaly  Chest - clear to auscultation, no wheezes, rales or rhonchi, symmetric air entry  Heart - normal rate, regular rhythm, normal S1, S2, no murmurs, rubs, clicks or gallops, no JVD  Abdomen - soft, nontender, nondistended, no masses or organomegaly  no rebound tenderness noted, non-distended  bowel sounds normal  Vaginal Exam: tight 3/70%/-2, no blood in " "vault, large amt stool in rectum  Extremities - tr pedal edema noted, no calf tend  Skin - normal coloration and turgor, no rashes, no suspicious skin lesions noted      Labs:    Results from last 7 days  Lab Units 01/23/17  0611   WBC 10*3/mm3 8.38   HEMOGLOBIN g/dL 9.6*   HEMATOCRIT % 29.3*   PLATELETS 10*3/mm3 139*       Results from last 7 days  Lab Units 01/23/17  0611   CREATININE mg/dL 0.60   BILIRUBIN mg/dL 0.6   ALK PHOS U/L 179*   ALT (SGPT) U/L 22   AST (SGOT) U/L 26         Fetal monitoring: indication contractions , onset 0656 , offset 0800 , baseline 145 , mod BTB variability , multiple accels (15 X 15), no decels, q2-5\" contractions, interpretation reactive NST    Radiology     Assessment 1)IUP 38 4/7 weeks     2)prob early labor     3)poss UTI    Plan 1)observe     2)discussed with Dr. Pilar Cassidy MD  1/23/2017  7:58 AM                                                                    "

## 2017-01-23 NOTE — PROGRESS NOTES
Alexandria Stock  1995        Alexandria Stock states her pain is controlled, her contractions are now about every 5-7 minutes (were 3-5 minutes apart when she came to the hospital). She denies vb/LOF. She admits to active fetal movement.  Vitals:    17 0554 17 0607 17 0623 17 0723   BP: 123/79 123/72 116/69 114/76   BP Location:    Right arm   Patient Position:    Lying   Pulse: (!) 127 112 109 109   Resp:    16   Temp:    98.1 °F (36.7 °C)   TempSrc:    Oral   Weight:       Height:             General Appearance:  awake, alert, oriented, in no acute distress and well developed, well nourished    Cervix: 2-3/50%/Floating/posterior/soft/cephalic    Baseline: 135 bpm, Variability: moderate, Accelerations: Reactive and Decelerations: Absent Category 1  irregular, every 3-7 minutes    Impression: 21 y.o.  at 38w4d                              false labor vs early latent labor                              Category 1                             GBS Negative  Plan: Therapeutic rest. D/C home. Pt to return when painful ctx q3-5 minutes or for ROM or vb. Discussed with pt.    Amberly Quezada DO

## 2017-01-26 ENCOUNTER — APPOINTMENT (OUTPATIENT)
Dept: LABOR AND DELIVERY | Facility: HOSPITAL | Age: 22
End: 2017-01-26

## 2017-01-27 ENCOUNTER — ANESTHESIA (OUTPATIENT)
Dept: LABOR AND DELIVERY | Facility: HOSPITAL | Age: 22
End: 2017-01-27

## 2017-01-27 ENCOUNTER — ANESTHESIA EVENT (OUTPATIENT)
Dept: LABOR AND DELIVERY | Facility: HOSPITAL | Age: 22
End: 2017-01-27

## 2017-01-27 ENCOUNTER — HOSPITAL ENCOUNTER (INPATIENT)
Dept: LABOR AND DELIVERY | Facility: HOSPITAL | Age: 22
LOS: 2 days | Discharge: HOME | End: 2017-01-29
Attending: OBSTETRICS & GYNECOLOGY | Admitting: OBSTETRICS & GYNECOLOGY

## 2017-01-27 DIAGNOSIS — O09.91 HIGH-RISK PREGNANCY IN FIRST TRIMESTER: ICD-10-CM

## 2017-01-27 PROBLEM — Z34.90 TERM PREGNANCY: Status: RESOLVED | Noted: 2017-01-27 | Resolved: 2017-01-27

## 2017-01-27 PROBLEM — O47.1 FALSE LABOR AFTER 37 WEEKS OF GESTATION WITHOUT DELIVERY: Status: RESOLVED | Noted: 2017-01-23 | Resolved: 2017-01-27

## 2017-01-27 PROBLEM — Z34.90 TERM PREGNANCY: Status: ACTIVE | Noted: 2017-01-27

## 2017-01-27 LAB
ABO GROUP BLD: NORMAL
ATMOSPHERIC PRESS: ABNORMAL MMHG
ATMOSPHERIC PRESS: ABNORMAL MMHG
BASE EXCESS BLDCOA CALC-SCNC: -5.3 MMOL/L (ref 0–2)
BASE EXCESS BLDCOV CALC-SCNC: -5.5 MMOL/L (ref 0–2)
BDY SITE: ABNORMAL
BLD GP AB SCN SERPL QL: NEGATIVE
CO2 BLDA-SCNC: 19.7 MMOL/L (ref 22–33)
CO2 BLDA-SCNC: 24.3 MMOL/L (ref 22–33)
DEPRECATED RDW RBC AUTO: 42.1 FL (ref 37–54)
ERYTHROCYTE [DISTWIDTH] IN BLOOD BY AUTOMATED COUNT: 13.9 % (ref 11.3–14.5)
HCO3 BLDCOA-SCNC: 22.6 MMOL/L (ref 16.9–20.5)
HCO3 BLDCOV-SCNC: 18.8 MMOL/L (ref 18.6–21.4)
HCT VFR BLD AUTO: 29.1 % (ref 34.5–44)
HGB BLD-MCNC: 9.4 G/DL (ref 11.5–15.5)
HGB BLDA-MCNC: 16.5 G/DL (ref 14–18)
HGB BLDA-MCNC: 17.4 G/DL (ref 14–18)
HOROWITZ INDEX BLD+IHG-RTO: 21 %
HOROWITZ INDEX BLD+IHG-RTO: 21 %
MCH RBC QN AUTO: 26.9 PG (ref 27–31)
MCHC RBC AUTO-ENTMCNC: 32.3 G/DL (ref 32–36)
MCV RBC AUTO: 83.4 FL (ref 80–99)
MODALITY: ABNORMAL
MODALITY: ABNORMAL
PCO2 BLDCOA: 54.6 MMHG (ref 43.3–54.9)
PCO2 BLDCOV: 31.4 MM HG (ref 30–60)
PH BLDCOA: 7.23 [PH] (ref 7.2–7.3)
PH BLDCOV: 7.39 [PH] (ref 7.19–7.46)
PLATELET # BLD AUTO: 148 10*3/MM3 (ref 150–450)
PMV BLD AUTO: 10.9 FL (ref 6–12)
PO2 BLDCOA: 24.6 MMHG (ref 11.5–43.3)
PO2 BLDCOV: 36.5 MM HG
RBC # BLD AUTO: 3.49 10*6/MM3 (ref 3.89–5.14)
RH BLD: POSITIVE
SAO2 % BLDCOA: 42.9 %
SAO2 % BLDCOA: ABNORMAL % (ref 45–75)
SAO2 % BLDCOV: 79.8 %
WBC NRBC COR # BLD: 7.08 10*3/MM3 (ref 3.5–10.8)

## 2017-01-27 PROCEDURE — 86850 RBC ANTIBODY SCREEN: CPT

## 2017-01-27 PROCEDURE — 25010000002 ROPIVACAINE PER 1 MG: Performed by: NURSE ANESTHETIST, CERTIFIED REGISTERED

## 2017-01-27 PROCEDURE — 86900 BLOOD TYPING SEROLOGIC ABO: CPT

## 2017-01-27 PROCEDURE — 86901 BLOOD TYPING SEROLOGIC RH(D): CPT

## 2017-01-27 PROCEDURE — 82805 BLOOD GASES W/O2 SATURATION: CPT | Performed by: OBSTETRICS & GYNECOLOGY

## 2017-01-27 PROCEDURE — 25010000002 FENTANYL CITRATE (PF) 100 MCG/2ML SOLUTION: Performed by: NURSE ANESTHETIST, CERTIFIED REGISTERED

## 2017-01-27 PROCEDURE — 10907ZC DRAINAGE OF AMNIOTIC FLUID, THERAPEUTIC FROM PRODUCTS OF CONCEPTION, VIA NATURAL OR ARTIFICIAL OPENING: ICD-10-PCS | Performed by: OBSTETRICS & GYNECOLOGY

## 2017-01-27 PROCEDURE — 3E033VJ INTRODUCTION OF OTHER HORMONE INTO PERIPHERAL VEIN, PERCUTANEOUS APPROACH: ICD-10-PCS | Performed by: OBSTETRICS & GYNECOLOGY

## 2017-01-27 PROCEDURE — 59400 OBSTETRICAL CARE: CPT | Performed by: OBSTETRICS & GYNECOLOGY

## 2017-01-27 PROCEDURE — 59025 FETAL NON-STRESS TEST: CPT

## 2017-01-27 PROCEDURE — 85027 COMPLETE CBC AUTOMATED: CPT | Performed by: OBSTETRICS & GYNECOLOGY

## 2017-01-27 PROCEDURE — C1755 CATHETER, INTRASPINAL: HCPCS

## 2017-01-27 RX ORDER — MISOPROSTOL 200 UG/1
800 TABLET ORAL AS NEEDED
Status: DISCONTINUED | OUTPATIENT
Start: 2017-01-27 | End: 2017-01-27 | Stop reason: HOSPADM

## 2017-01-27 RX ORDER — FERROUS SULFATE 325(65) MG
325 TABLET ORAL 2 TIMES DAILY WITH MEALS
Status: DISCONTINUED | OUTPATIENT
Start: 2017-01-27 | End: 2017-01-29 | Stop reason: HOSPADM

## 2017-01-27 RX ORDER — LIDOCAINE HYDROCHLORIDE AND EPINEPHRINE 20; 5 MG/ML; UG/ML
INJECTION, SOLUTION EPIDURAL; INFILTRATION; INTRACAUDAL; PERINEURAL AS NEEDED
Status: DISCONTINUED | OUTPATIENT
Start: 2017-01-27 | End: 2017-01-27 | Stop reason: SURG

## 2017-01-27 RX ORDER — METOCLOPRAMIDE HYDROCHLORIDE 5 MG/ML
10 INJECTION INTRAMUSCULAR; INTRAVENOUS ONCE AS NEEDED
Status: DISCONTINUED | OUTPATIENT
Start: 2017-01-27 | End: 2017-01-27 | Stop reason: HOSPADM

## 2017-01-27 RX ORDER — SODIUM CHLORIDE 0.9 % (FLUSH) 0.9 %
1-10 SYRINGE (ML) INJECTION AS NEEDED
Status: DISCONTINUED | OUTPATIENT
Start: 2017-01-27 | End: 2017-01-29 | Stop reason: HOSPADM

## 2017-01-27 RX ORDER — DIPHENHYDRAMINE HYDROCHLORIDE 50 MG/ML
12.5 INJECTION INTRAMUSCULAR; INTRAVENOUS EVERY 8 HOURS PRN
Status: DISCONTINUED | OUTPATIENT
Start: 2017-01-27 | End: 2017-01-27 | Stop reason: HOSPADM

## 2017-01-27 RX ORDER — ONDANSETRON 2 MG/ML
4 INJECTION INTRAMUSCULAR; INTRAVENOUS EVERY 6 HOURS PRN
Status: DISCONTINUED | OUTPATIENT
Start: 2017-01-27 | End: 2017-01-27 | Stop reason: HOSPADM

## 2017-01-27 RX ORDER — DOCUSATE SODIUM 100 MG/1
100 CAPSULE, LIQUID FILLED ORAL 2 TIMES DAILY
Status: DISCONTINUED | OUTPATIENT
Start: 2017-01-27 | End: 2017-01-29 | Stop reason: HOSPADM

## 2017-01-27 RX ORDER — LIDOCAINE HYDROCHLORIDE AND EPINEPHRINE 15; 5 MG/ML; UG/ML
INJECTION, SOLUTION EPIDURAL AS NEEDED
Status: DISCONTINUED | OUTPATIENT
Start: 2017-01-27 | End: 2017-01-27 | Stop reason: SURG

## 2017-01-27 RX ORDER — LIDOCAINE HYDROCHLORIDE 10 MG/ML
5 INJECTION, SOLUTION INFILTRATION; PERINEURAL AS NEEDED
Status: DISCONTINUED | OUTPATIENT
Start: 2017-01-27 | End: 2017-01-27 | Stop reason: HOSPADM

## 2017-01-27 RX ORDER — HYDROCODONE BITARTRATE AND ACETAMINOPHEN 5; 325 MG/1; MG/1
1 TABLET ORAL EVERY 4 HOURS PRN
Status: DISCONTINUED | OUTPATIENT
Start: 2017-01-27 | End: 2017-01-29 | Stop reason: HOSPADM

## 2017-01-27 RX ORDER — TERBUTALINE SULFATE 1 MG/ML
0.25 INJECTION, SOLUTION SUBCUTANEOUS AS NEEDED
Status: DISCONTINUED | OUTPATIENT
Start: 2017-01-27 | End: 2017-01-27 | Stop reason: HOSPADM

## 2017-01-27 RX ORDER — CARBOPROST TROMETHAMINE 250 UG/ML
250 INJECTION, SOLUTION INTRAMUSCULAR AS NEEDED
Status: DISCONTINUED | OUTPATIENT
Start: 2017-01-27 | End: 2017-01-27 | Stop reason: HOSPADM

## 2017-01-27 RX ORDER — EPHEDRINE SULFATE/0.9% NACL/PF 50 MG/10ML
5 SYRINGE (ML) INTRAVENOUS
Status: DISCONTINUED | OUTPATIENT
Start: 2017-01-27 | End: 2017-01-27 | Stop reason: HOSPADM

## 2017-01-27 RX ORDER — OXYTOCIN/RINGER'S LACTATE 20/1000 ML
125 PLASTIC BAG, INJECTION (ML) INTRAVENOUS AS NEEDED
Status: DISCONTINUED | OUTPATIENT
Start: 2017-01-27 | End: 2017-01-27 | Stop reason: HOSPADM

## 2017-01-27 RX ORDER — FAMOTIDINE 10 MG/ML
20 INJECTION, SOLUTION INTRAVENOUS ONCE AS NEEDED
Status: COMPLETED | OUTPATIENT
Start: 2017-01-27 | End: 2017-01-27

## 2017-01-27 RX ORDER — PROMETHAZINE HYDROCHLORIDE 25 MG/ML
12.5 INJECTION, SOLUTION INTRAMUSCULAR; INTRAVENOUS EVERY 6 HOURS PRN
Status: DISCONTINUED | OUTPATIENT
Start: 2017-01-27 | End: 2017-01-27 | Stop reason: HOSPADM

## 2017-01-27 RX ORDER — PROMETHAZINE HYDROCHLORIDE 12.5 MG/1
12.5 TABLET ORAL EVERY 6 HOURS PRN
Status: DISCONTINUED | OUTPATIENT
Start: 2017-01-27 | End: 2017-01-27 | Stop reason: HOSPADM

## 2017-01-27 RX ORDER — METOCLOPRAMIDE HYDROCHLORIDE 5 MG/ML
10 INJECTION INTRAMUSCULAR; INTRAVENOUS EVERY 6 HOURS PRN
Status: DISCONTINUED | OUTPATIENT
Start: 2017-01-27 | End: 2017-01-27 | Stop reason: HOSPADM

## 2017-01-27 RX ORDER — FENTANYL CITRATE 50 UG/ML
INJECTION, SOLUTION INTRAMUSCULAR; INTRAVENOUS AS NEEDED
Status: DISCONTINUED | OUTPATIENT
Start: 2017-01-27 | End: 2017-01-27 | Stop reason: SURG

## 2017-01-27 RX ORDER — ONDANSETRON 2 MG/ML
4 INJECTION INTRAMUSCULAR; INTRAVENOUS ONCE AS NEEDED
Status: DISCONTINUED | OUTPATIENT
Start: 2017-01-27 | End: 2017-01-27 | Stop reason: HOSPADM

## 2017-01-27 RX ORDER — SODIUM CHLORIDE 0.9 % (FLUSH) 0.9 %
1-10 SYRINGE (ML) INJECTION AS NEEDED
Status: DISCONTINUED | OUTPATIENT
Start: 2017-01-27 | End: 2017-01-27 | Stop reason: HOSPADM

## 2017-01-27 RX ORDER — SODIUM CHLORIDE, SODIUM LACTATE, POTASSIUM CHLORIDE, CALCIUM CHLORIDE 600; 310; 30; 20 MG/100ML; MG/100ML; MG/100ML; MG/100ML
125 INJECTION, SOLUTION INTRAVENOUS CONTINUOUS
Status: DISCONTINUED | OUTPATIENT
Start: 2017-01-27 | End: 2017-01-28

## 2017-01-27 RX ORDER — METHYLERGONOVINE MALEATE 0.2 MG/ML
200 INJECTION INTRAVENOUS ONCE AS NEEDED
Status: DISCONTINUED | OUTPATIENT
Start: 2017-01-27 | End: 2017-01-27 | Stop reason: HOSPADM

## 2017-01-27 RX ORDER — OXYTOCIN/RINGER'S LACTATE 20/1000 ML
999 PLASTIC BAG, INJECTION (ML) INTRAVENOUS ONCE
Status: COMPLETED | OUTPATIENT
Start: 2017-01-27 | End: 2017-01-27

## 2017-01-27 RX ORDER — PROMETHAZINE HYDROCHLORIDE 12.5 MG/1
12.5 SUPPOSITORY RECTAL EVERY 6 HOURS PRN
Status: DISCONTINUED | OUTPATIENT
Start: 2017-01-27 | End: 2017-01-27 | Stop reason: HOSPADM

## 2017-01-27 RX ORDER — IBUPROFEN 600 MG/1
600 TABLET ORAL EVERY 8 HOURS PRN
Status: DISCONTINUED | OUTPATIENT
Start: 2017-01-27 | End: 2017-01-28

## 2017-01-27 RX ORDER — LANOLIN 100 %
OINTMENT (GRAM) TOPICAL
Status: DISCONTINUED | OUTPATIENT
Start: 2017-01-27 | End: 2017-01-29 | Stop reason: HOSPADM

## 2017-01-27 RX ORDER — PRENATAL WITH FERROUS FUM AND FOLIC ACID 3080; 920; 120; 400; 22; 1.84; 3; 20; 10; 1; 12; 200; 27; 25; 2 [IU]/1; [IU]/1; MG/1; [IU]/1; MG/1; MG/1; MG/1; MG/1; MG/1; MG/1; UG/1; MG/1; MG/1; MG/1; MG/1
1 TABLET ORAL DAILY
Status: DISCONTINUED | OUTPATIENT
Start: 2017-01-27 | End: 2017-01-29 | Stop reason: HOSPADM

## 2017-01-27 RX ORDER — ONDANSETRON 4 MG/1
4 TABLET, FILM COATED ORAL EVERY 6 HOURS PRN
Status: DISCONTINUED | OUTPATIENT
Start: 2017-01-27 | End: 2017-01-27 | Stop reason: HOSPADM

## 2017-01-27 RX ORDER — OXYTOCIN/RINGER'S LACTATE 30/500 ML
2-24 PLASTIC BAG, INJECTION (ML) INTRAVENOUS
Status: DISCONTINUED | OUTPATIENT
Start: 2017-01-27 | End: 2017-01-27 | Stop reason: HOSPADM

## 2017-01-27 RX ORDER — OXYTOCIN/RINGER'S LACTATE 20/1000 ML
1000 PLASTIC BAG, INJECTION (ML) INTRAVENOUS CONTINUOUS
Status: ACTIVE | OUTPATIENT
Start: 2017-01-27 | End: 2017-01-27

## 2017-01-27 RX ADMIN — FAMOTIDINE 20 MG: 10 INJECTION, SOLUTION INTRAVENOUS at 14:20

## 2017-01-27 RX ADMIN — SODIUM CHLORIDE, POTASSIUM CHLORIDE, SODIUM LACTATE AND CALCIUM CHLORIDE 1000 ML: 600; 310; 30; 20 INJECTION, SOLUTION INTRAVENOUS at 11:54

## 2017-01-27 RX ADMIN — SODIUM CHLORIDE, POTASSIUM CHLORIDE, SODIUM LACTATE AND CALCIUM CHLORIDE 125 ML/HR: 600; 310; 30; 20 INJECTION, SOLUTION INTRAVENOUS at 06:40

## 2017-01-27 RX ADMIN — SODIUM CHLORIDE, POTASSIUM CHLORIDE, SODIUM LACTATE AND CALCIUM CHLORIDE 1000 ML/HR: 600; 310; 30; 20 INJECTION, SOLUTION INTRAVENOUS at 12:17

## 2017-01-27 RX ADMIN — HYDROCODONE BITARTRATE AND ACETAMINOPHEN 1 TABLET: 5; 325 TABLET ORAL at 20:15

## 2017-01-27 RX ADMIN — SODIUM CHLORIDE, POTASSIUM CHLORIDE, SODIUM LACTATE AND CALCIUM CHLORIDE 125 ML/HR: 600; 310; 30; 20 INJECTION, SOLUTION INTRAVENOUS at 12:45

## 2017-01-27 RX ADMIN — ROPIVACAINE HYDROCHLORIDE 16 ML/HR: 5 INJECTION, SOLUTION EPIDURAL; INFILTRATION; PERINEURAL at 12:50

## 2017-01-27 RX ADMIN — Medication 2 MILLI-UNITS/MIN: at 07:26

## 2017-01-27 RX ADMIN — LIDOCAINE HYDROCHLORIDE,EPINEPHRINE BITARTRATE 5 ML: 20; .005 INJECTION, SOLUTION EPIDURAL; INFILTRATION; INTRACAUDAL; PERINEURAL at 16:55

## 2017-01-27 RX ADMIN — FENTANYL CITRATE 100 MCG: 50 INJECTION, SOLUTION INTRAMUSCULAR; INTRAVENOUS at 12:44

## 2017-01-27 RX ADMIN — LIDOCAINE HYDROCHLORIDE AND EPINEPHRINE 2 ML: 15; 5 INJECTION, SOLUTION EPIDURAL at 12:43

## 2017-01-27 RX ADMIN — LIDOCAINE HYDROCHLORIDE AND EPINEPHRINE 3 ML: 15; 5 INJECTION, SOLUTION EPIDURAL at 12:40

## 2017-01-27 RX ADMIN — Medication 125 ML/HR: at 18:19

## 2017-01-27 RX ADMIN — ROPIVACAINE HYDROCHLORIDE 10 ML: 5 INJECTION, SOLUTION EPIDURAL; INFILTRATION; PERINEURAL at 12:46

## 2017-01-27 RX ADMIN — Medication 999 ML/HR: at 17:26

## 2017-01-27 NOTE — ANESTHESIA PREPROCEDURE EVALUATION
Anesthesia Evaluation     Patient summary reviewed and Nursing notes reviewed    No history of anesthetic complications   Airway   Mallampati: I  TM distance: >3 FB  Neck ROM: full  no difficulty expected  Dental - normal exam     Pulmonary    (+) asthma,   Cardiovascular     Neuro/Psych  (+) psychiatric history Depression,    GI/Hepatic/Renal/Endo    (+)  hyperthyroidism (Seen by endocrinologist in Dec-euthyroid per pt report.)    Musculoskeletal (-) negative ROS    Abdominal    Substance History - negative use     OB/GYN    (+) Pregnant,         Other         Other Comment: anemia                    Anesthesia Plan    ASA 2     epidural     Anesthetic plan and risks discussed with patient.

## 2017-01-27 NOTE — ANESTHESIA PROCEDURE NOTES
Labor Epidural    Patient location during procedure: OB  Performed By  Anesthesiologist: BLAIR AVILA  CRNA: SHAKIRA JULES  Preanesthetic Checklist  Completed: patient identified, surgical consent, pre-op evaluation, timeout performed, IV checked, risks and benefits discussed and monitors and equipment checked  Epidural Block Prep:  Pt Position:sitting  Sterile Tech:cap, gloves, mask and sterile barrier  Monitoring:blood pressure monitoring  Epidural Block Procedure:  Approach:midline  Guidance:palpation technique  Location:L3-L4  Needle Type:Tuohy  Needle Gauge:17 G  Cath Depth at skin:12 cm  Paresthesia: none  Aspiration:negative  Test Dose:negative  Post Assessment:  Dressing:occlusive dressing applied and secured with tape  Pt Tolerance:patient tolerated the procedure well with no apparent complications  Complications:no

## 2017-01-28 LAB
BASOPHILS # BLD AUTO: 0.01 10*3/MM3 (ref 0–0.2)
BASOPHILS NFR BLD AUTO: 0.1 % (ref 0–1)
DEPRECATED RDW RBC AUTO: 42 FL (ref 37–54)
EOSINOPHIL # BLD AUTO: 0.2 10*3/MM3 (ref 0.1–0.3)
EOSINOPHIL NFR BLD AUTO: 1.8 % (ref 0–3)
ERYTHROCYTE [DISTWIDTH] IN BLOOD BY AUTOMATED COUNT: 13.9 % (ref 11.3–14.5)
HCT VFR BLD AUTO: 28.7 % (ref 34.5–44)
HGB BLD-MCNC: 9.4 G/DL (ref 11.5–15.5)
IMM GRANULOCYTES # BLD: 0.05 10*3/MM3 (ref 0–0.03)
IMM GRANULOCYTES NFR BLD: 0.5 % (ref 0–0.6)
LYMPHOCYTES # BLD AUTO: 2.12 10*3/MM3 (ref 0.6–4.8)
LYMPHOCYTES NFR BLD AUTO: 19.6 % (ref 24–44)
MCH RBC QN AUTO: 27.3 PG (ref 27–31)
MCHC RBC AUTO-ENTMCNC: 32.8 G/DL (ref 32–36)
MCV RBC AUTO: 83.4 FL (ref 80–99)
MONOCYTES # BLD AUTO: 1.11 10*3/MM3 (ref 0–1)
MONOCYTES NFR BLD AUTO: 10.2 % (ref 0–12)
NEUTROPHILS # BLD AUTO: 7.35 10*3/MM3 (ref 1.5–8.3)
NEUTROPHILS NFR BLD AUTO: 67.8 % (ref 41–71)
NRBC BLD MANUAL-RTO: 0 /100 WBC (ref 0–0)
PLATELET # BLD AUTO: 178 10*3/MM3 (ref 150–450)
PMV BLD AUTO: 11.4 FL (ref 6–12)
RBC # BLD AUTO: 3.44 10*6/MM3 (ref 3.89–5.14)
WBC NRBC COR # BLD: 10.84 10*3/MM3 (ref 3.5–10.8)

## 2017-01-28 PROCEDURE — 85025 COMPLETE CBC W/AUTO DIFF WBC: CPT | Performed by: OBSTETRICS & GYNECOLOGY

## 2017-01-28 RX ORDER — IBUPROFEN 600 MG/1
600 TABLET ORAL EVERY 6 HOURS PRN
Status: DISCONTINUED | OUTPATIENT
Start: 2017-01-28 | End: 2017-01-29 | Stop reason: HOSPADM

## 2017-01-28 RX ADMIN — Medication 325 MG: at 08:38

## 2017-01-28 RX ADMIN — IBUPROFEN 600 MG: 600 TABLET ORAL at 08:39

## 2017-01-28 RX ADMIN — HYDROCODONE BITARTRATE AND ACETAMINOPHEN 1 TABLET: 5; 325 TABLET ORAL at 12:16

## 2017-01-28 RX ADMIN — IBUPROFEN 600 MG: 600 TABLET ORAL at 21:48

## 2017-01-28 RX ADMIN — HYDROCODONE BITARTRATE AND ACETAMINOPHEN 1 TABLET: 5; 325 TABLET ORAL at 08:39

## 2017-01-28 RX ADMIN — HYDROCODONE BITARTRATE AND ACETAMINOPHEN 1 TABLET: 5; 325 TABLET ORAL at 04:07

## 2017-01-28 RX ADMIN — DOCUSATE SODIUM 100 MG: 100 CAPSULE, LIQUID FILLED ORAL at 21:48

## 2017-01-28 RX ADMIN — IBUPROFEN 600 MG: 600 TABLET ORAL at 14:43

## 2017-01-28 RX ADMIN — Medication 325 MG: at 17:18

## 2017-01-28 RX ADMIN — HYDROCODONE BITARTRATE AND ACETAMINOPHEN 1 TABLET: 5; 325 TABLET ORAL at 21:48

## 2017-01-28 RX ADMIN — PRENATAL WITH FERROUS FUM AND FOLIC ACID 1 TABLET: 3080; 920; 120; 400; 22; 1.84; 3; 20; 10; 1; 12; 200; 27; 25; 2 TABLET ORAL at 08:38

## 2017-01-28 RX ADMIN — DOCUSATE SODIUM 100 MG: 100 CAPSULE, LIQUID FILLED ORAL at 08:38

## 2017-01-28 RX ADMIN — HYDROCODONE BITARTRATE AND ACETAMINOPHEN 1 TABLET: 5; 325 TABLET ORAL at 17:18

## 2017-01-29 VITALS
BODY MASS INDEX: 32.33 KG/M2 | WEIGHT: 206 LBS | SYSTOLIC BLOOD PRESSURE: 117 MMHG | TEMPERATURE: 97.7 F | HEART RATE: 82 BPM | DIASTOLIC BLOOD PRESSURE: 58 MMHG | HEIGHT: 67 IN | RESPIRATION RATE: 20 BRPM

## 2017-01-29 RX ORDER — IBUPROFEN 600 MG/1
600 TABLET ORAL EVERY 6 HOURS PRN
Qty: 40 TABLET | Refills: 0 | Status: SHIPPED | OUTPATIENT
Start: 2017-01-29 | End: 2020-09-21

## 2017-01-29 RX ORDER — HYDROCODONE BITARTRATE AND ACETAMINOPHEN 5; 325 MG/1; MG/1
1 TABLET ORAL EVERY 4 HOURS PRN
Qty: 10 TABLET | Refills: 0 | Status: SHIPPED | OUTPATIENT
Start: 2017-01-29 | End: 2017-02-06

## 2017-01-29 RX ORDER — ONDANSETRON 4 MG/1
4 TABLET, FILM COATED ORAL EVERY 6 HOURS PRN
Status: DISCONTINUED | OUTPATIENT
Start: 2017-01-29 | End: 2017-01-29 | Stop reason: HOSPADM

## 2017-01-29 RX ORDER — ONDANSETRON 4 MG/1
4 TABLET, FILM COATED ORAL EVERY 6 HOURS PRN
Qty: 10 TABLET | Refills: 0 | Status: SHIPPED | OUTPATIENT
Start: 2017-01-29 | End: 2017-03-08

## 2017-01-29 RX ADMIN — IBUPROFEN 600 MG: 600 TABLET ORAL at 13:30

## 2017-01-29 RX ADMIN — HYDROCODONE BITARTRATE AND ACETAMINOPHEN 1 TABLET: 5; 325 TABLET ORAL at 03:32

## 2017-01-29 RX ADMIN — DOCUSATE SODIUM 100 MG: 100 CAPSULE, LIQUID FILLED ORAL at 07:56

## 2017-01-29 RX ADMIN — IBUPROFEN 600 MG: 600 TABLET ORAL at 03:32

## 2017-01-29 RX ADMIN — HYDROCODONE BITARTRATE AND ACETAMINOPHEN 1 TABLET: 5; 325 TABLET ORAL at 07:56

## 2017-01-29 RX ADMIN — Medication 325 MG: at 07:56

## 2017-01-29 RX ADMIN — HYDROCODONE BITARTRATE AND ACETAMINOPHEN 1 TABLET: 5; 325 TABLET ORAL at 13:30

## 2017-01-29 RX ADMIN — PRENATAL WITH FERROUS FUM AND FOLIC ACID 1 TABLET: 3080; 920; 120; 400; 22; 1.84; 3; 20; 10; 1; 12; 200; 27; 25; 2 TABLET ORAL at 07:56

## 2017-03-08 ENCOUNTER — OFFICE VISIT (OUTPATIENT)
Dept: ENDOCRINOLOGY | Facility: CLINIC | Age: 22
End: 2017-03-08

## 2017-03-08 VITALS
WEIGHT: 181.2 LBS | HEART RATE: 75 BPM | BODY MASS INDEX: 28.44 KG/M2 | HEIGHT: 67 IN | SYSTOLIC BLOOD PRESSURE: 115 MMHG | OXYGEN SATURATION: 100 % | DIASTOLIC BLOOD PRESSURE: 65 MMHG

## 2017-03-08 DIAGNOSIS — R94.6 ABNORMAL THYROID FUNCTION TEST: Primary | ICD-10-CM

## 2017-03-08 DIAGNOSIS — R94.6 ABNORMAL THYROID FUNCTION TEST: ICD-10-CM

## 2017-03-08 LAB
T4 FREE SERPL-MCNC: 1.2 NG/DL (ref 0.89–1.76)
TSH SERPL DL<=0.05 MIU/L-ACNC: 0.56 MIU/ML (ref 0.35–5.35)

## 2017-03-08 PROCEDURE — 84443 ASSAY THYROID STIM HORMONE: CPT | Performed by: INTERNAL MEDICINE

## 2017-03-08 PROCEDURE — 84439 ASSAY OF FREE THYROXINE: CPT | Performed by: INTERNAL MEDICINE

## 2017-03-08 PROCEDURE — 99212 OFFICE O/P EST SF 10 MIN: CPT | Performed by: INTERNAL MEDICINE

## 2017-03-08 RX ORDER — AZITHROMYCIN 1 G
1 PACKET (EA) ORAL ONCE
COMMUNITY
End: 2017-03-22

## 2017-03-22 ENCOUNTER — POSTPARTUM VISIT (OUTPATIENT)
Dept: OBSTETRICS AND GYNECOLOGY | Facility: CLINIC | Age: 22
End: 2017-03-22

## 2017-03-22 VITALS
BODY MASS INDEX: 28.25 KG/M2 | SYSTOLIC BLOOD PRESSURE: 116 MMHG | DIASTOLIC BLOOD PRESSURE: 70 MMHG | WEIGHT: 180 LBS | HEIGHT: 67 IN

## 2017-03-22 DIAGNOSIS — Z30.09 ENCOUNTER FOR OTHER GENERAL COUNSELING OR ADVICE ON CONTRACEPTION: ICD-10-CM

## 2017-03-22 PROCEDURE — 0503F POSTPARTUM CARE VISIT: CPT | Performed by: OBSTETRICS & GYNECOLOGY

## 2017-03-22 NOTE — PROGRESS NOTES
"Subjective   Chief Complaint   Patient presents with   • Postpartum Care     s/p vag del 17   • Contraception     wants to discuss     Alexandria Stock is a 21 y.o. year old  presenting to be seen for her postpartum visit.  She had a vaginal delivery.  Her pregnancy was significant for thyroid issues which have resolved now and are being followed by Dr Mahmood.    Since delivery she has been sexually active. - used condoms. She does not have concerns about post-partum blues/depression.  She is breast feeding and plans to continues for 12 month(s).  For ongoing contraception, she is considering IUD - Mirena for birth control.    No Additional Complaints Reported    The following portions of the patient's history were reviewed and updated as appropriate:current medications, allergies and past surgical history    Review of Systems normal bladder and bowels     Objective   /70  Ht 67\" (170.2 cm)  Wt 180 lb (81.6 kg)  Breastfeeding? Yes  BMI 28.19 kg/m2    General:  well developed; well nourished  no acute distress   Breasts:  Not performed.  Patient is lactating.  No areas of erythema or tenderness noted and the nipples are normal   Abdomen: soft, non-tender; no masses  no umbilical or inginual hernias are present  no hepato-splenomegaly   Pelvis: Not performed.          Assessment   1. Normal postpartum visit  2. IUD consult  3.      Plan   1. Info Mirena / motrin 600 qhs and am of appt    Medications Rx this encounter:  No orders of the defined types were placed in this encounter.         This note was electronically signed.    Fausto Stafford MD  2017    "

## 2017-03-25 NOTE — PROGRESS NOTES
Chief Complaint   Patient presents with   • Abnormal Thyroid Function     Follow UP  postpartum     HPI:   Alexandria Stock is a 21 y.o.female who returns to endocrine clinic for f/u evaluation of her thyroid gland function postpartum. Had h/o gestational transient thyrotoxicosis. Last visit 12/07/2016.  Her history is as follows:    Interim Events: normal vaginal delivery on 01/29/2017. Overall feeling well.     1) h/o hyperthyroidism during pregnancy (gestational transient thyrotoxicosis):   - pt was found to have a slightly decreased TSH in march and April of 2016 at her PCP's office. Pt was asymptomatic at that time.   3/2016: TSH 0.13 (0.40 - 4.50), free T4 1.1 (normal), free T3 3.9 (normal)  4/2016: TSH 0.21 (0.40 - 4.50)    In late may, early June 2016 she was evaluated for her first pregnancy. She reports feeling very ill during the first trimester. She had symptoms of significant nausea, palpitations, increased anxiety and depression, and significant myalgias.  On 6/24/2016 labs were c/w subclinical hyperthyroidism: TSH <0.004, free T4 normal at 1.32, free T3 normal at 4.3  Because of her symptoms, she was prescribed methimazole 5 mg TID at 13 weeks. She reported feeling better after the medication was initiated.  - In 12/2016, initial endocrine visit, checked TSH receptor antibody which was normal. I instructed her to stop the methimazole at that time    FMHx: paternal grandmother had hyperthyroidism, paternal aunt hypothyroidism  PMHx: no significant childhood illness    Review of Systems   Constitutional: Negative.    HENT: Negative.    Eyes: Negative.    Respiratory: Negative.    Cardiovascular: Negative.    Gastrointestinal: Negative.    Endocrine: Negative.    Genitourinary: Negative.    Musculoskeletal: Negative.    Skin: Negative.    Allergic/Immunologic: Negative.    Neurological: Negative.    Hematological: Negative.    Psychiatric/Behavioral: Negative.      The following portions of the patient's  "history were reviewed and updated as appropriate: allergies, current medications, past family history, past medical history, past social history, past surgical history and problem list.    /65  Pulse 75  Ht 67\" (170.2 cm)  Wt 181 lb 3.2 oz (82.2 kg)  LMP 04/28/2016 (Exact Date)  SpO2 100%  BMI 28.38 kg/m2  Physical Exam   Constitutional: She is oriented to person, place, and time. She appears well-developed. No distress.   HENT:   Head: Normocephalic.   Mouth/Throat: Oropharynx is clear and moist.   Eyes: Conjunctivae and EOM are normal. Pupils are equal, round, and reactive to light.   Neck: No tracheal deviation present. No thyromegaly present.   No palpable thyroid nodules     Cardiovascular: Normal rate, regular rhythm and normal heart sounds.    No murmur heard.  Pulmonary/Chest: Effort normal and breath sounds normal. No respiratory distress.   Abdominal: Soft. Bowel sounds are normal. There is no tenderness.   Lymphadenopathy:     She has no cervical adenopathy.   Neurological: She is alert and oriented to person, place, and time. No cranial nerve deficit.   Skin: Skin is warm and dry. She is not diaphoretic. No erythema.   Psychiatric: She has a normal mood and affect. Her behavior is normal.   Vitals reviewed.    LABS/IMAGING: outside records reviewed and summarized in HPI  Results for orders placed or performed in visit on 03/08/17   TSH   Result Value Ref Range    TSH 0.560 0.350 - 5.350 mIU/mL   T4, Free   Result Value Ref Range    Free T4 1.20 0.89 - 1.76 ng/dL     ASSESSMENT/PLAN:  1) h/o abnormal thyroid function tests (gestational transient thyrotoxicosis): resolved  - clinically euthyroid on exam today.   - thyroid function tests checked today within normal limitis  - discussed with patient the possibility of postpartum thyroiditis. Reviewed symptoms of hyperthyroidism and instructed her to contact me if these symptoms occur.    RTC as needed          "

## 2017-05-06 NOTE — ANESTHESIA POSTPROCEDURE EVALUATION
Patient: Alexandria Stock    Procedure Summary     Date Anesthesia Start Anesthesia Stop Room / Location    01/27/17 1234 1726        Procedure Diagnosis Scheduled Providers Provider    LABOR ANALGESIA No diagnosis on file.  Loan Dias DO          Anesthesia Type: epidural  Last vitals  BP      Temp      Pulse     Resp      SpO2        Post Anesthesia Care and Evaluation    Patient location during evaluation: bedside  Patient participation: complete - patient participated  Level of consciousness: awake and alert  Pain score: 0  Pain management: adequate  Airway patency: patent  Anesthetic complications: No anesthetic complications    Cardiovascular status: acceptable  Respiratory status: acceptable  Hydration status: acceptable       Abdomen soft, non-tender, no guarding.

## 2017-08-07 ENCOUNTER — TELEPHONE (OUTPATIENT)
Dept: ENDOCRINOLOGY | Facility: CLINIC | Age: 22
End: 2017-08-07

## 2017-08-07 DIAGNOSIS — R94.6 ABNORMAL THYROID FUNCTION TEST: Primary | ICD-10-CM

## 2017-08-07 NOTE — TELEPHONE ENCOUNTER
Pt called me to let me know about symptoms of increased fatigue. Will order TFT's. Pt to complete at [facility]. Will call pt with results as soon as available.   Evelin Mahmood MD

## 2017-08-08 ENCOUNTER — LAB (OUTPATIENT)
Dept: LAB | Facility: HOSPITAL | Age: 22
End: 2017-08-08

## 2017-08-08 DIAGNOSIS — R94.6 ABNORMAL THYROID FUNCTION TEST: ICD-10-CM

## 2017-08-08 LAB
T4 FREE SERPL-MCNC: 1.34 NG/DL (ref 0.78–2.19)
TSH SERPL DL<=0.05 MIU/L-ACNC: <0.015 MIU/ML (ref 0.47–4.68)

## 2017-08-08 PROCEDURE — 84439 ASSAY OF FREE THYROXINE: CPT | Performed by: INTERNAL MEDICINE

## 2017-08-08 PROCEDURE — 36415 COLL VENOUS BLD VENIPUNCTURE: CPT

## 2017-08-08 PROCEDURE — 84443 ASSAY THYROID STIM HORMONE: CPT | Performed by: INTERNAL MEDICINE

## 2017-08-11 ENCOUNTER — TELEPHONE (OUTPATIENT)
Dept: ENDOCRINOLOGY | Facility: CLINIC | Age: 22
End: 2017-08-11

## 2017-08-11 RX ORDER — PROPRANOLOL HYDROCHLORIDE 10 MG/1
10 TABLET ORAL 2 TIMES DAILY
Qty: 60 TABLET | Refills: 5 | OUTPATIENT
Start: 2017-08-11 | End: 2019-09-23 | Stop reason: HOSPADM

## 2018-07-31 ENCOUNTER — TELEPHONE (OUTPATIENT)
Dept: ENDOCRINOLOGY | Facility: CLINIC | Age: 23
End: 2018-07-31

## 2018-07-31 ENCOUNTER — LAB (OUTPATIENT)
Dept: LAB | Facility: HOSPITAL | Age: 23
End: 2018-07-31

## 2018-07-31 DIAGNOSIS — R94.6 ABNORMAL THYROID FUNCTION TEST: Primary | ICD-10-CM

## 2018-07-31 DIAGNOSIS — R94.6 ABNORMAL THYROID FUNCTION TEST: ICD-10-CM

## 2018-07-31 LAB
T4 FREE SERPL-MCNC: 0.94 NG/DL (ref 0.78–2.19)
TSH SERPL DL<=0.05 MIU/L-ACNC: 1.83 MIU/ML (ref 0.47–4.68)

## 2018-07-31 PROCEDURE — 36415 COLL VENOUS BLD VENIPUNCTURE: CPT

## 2018-07-31 PROCEDURE — 84443 ASSAY THYROID STIM HORMONE: CPT

## 2018-07-31 PROCEDURE — 84439 ASSAY OF FREE THYROXINE: CPT

## 2018-07-31 NOTE — TELEPHONE ENCOUNTER
Pt called requesting TFT's to be checked. Orders placed. She will complete in Baljinder Mahmood MD

## 2018-08-01 ENCOUNTER — TELEPHONE (OUTPATIENT)
Dept: ENDOCRINOLOGY | Facility: CLINIC | Age: 23
End: 2018-08-01

## 2018-08-01 NOTE — TELEPHONE ENCOUNTER
Left message for patient about lab results. Thyroid function tests are normal at this time.   Evelin Mahmood MD

## 2019-09-22 ENCOUNTER — APPOINTMENT (OUTPATIENT)
Dept: GENERAL RADIOLOGY | Facility: HOSPITAL | Age: 24
End: 2019-09-22

## 2019-09-22 ENCOUNTER — HOSPITAL ENCOUNTER (EMERGENCY)
Facility: HOSPITAL | Age: 24
Discharge: HOME | End: 2019-09-23
Attending: STUDENT IN AN ORGANIZED HEALTH CARE EDUCATION/TRAINING PROGRAM | Admitting: STUDENT IN AN ORGANIZED HEALTH CARE EDUCATION/TRAINING PROGRAM

## 2019-09-22 DIAGNOSIS — R10.13 EPIGASTRIC PAIN: Primary | ICD-10-CM

## 2019-09-22 LAB
ALBUMIN SERPL-MCNC: 4.5 G/DL (ref 3.5–5.2)
ALBUMIN/GLOB SERPL: 1.4 G/DL
ALP SERPL-CCNC: 102 U/L (ref 39–117)
ALT SERPL W P-5'-P-CCNC: 28 U/L (ref 1–33)
ANION GAP SERPL CALCULATED.3IONS-SCNC: 13.5 MMOL/L (ref 5–15)
AST SERPL-CCNC: 24 U/L (ref 1–32)
BASOPHILS # BLD AUTO: 0.05 10*3/MM3 (ref 0–0.2)
BASOPHILS NFR BLD AUTO: 0.8 % (ref 0–1.5)
BILIRUB SERPL-MCNC: 0.3 MG/DL (ref 0.2–1.2)
BUN BLD-MCNC: 12 MG/DL (ref 6–20)
BUN/CREAT SERPL: 16.7 (ref 7–25)
CALCIUM SPEC-SCNC: 9.4 MG/DL (ref 8.6–10.5)
CHLORIDE SERPL-SCNC: 101 MMOL/L (ref 98–107)
CO2 SERPL-SCNC: 22.5 MMOL/L (ref 22–29)
CREAT BLD-MCNC: 0.72 MG/DL (ref 0.57–1)
DEPRECATED RDW RBC AUTO: 39.5 FL (ref 37–54)
EOSINOPHIL # BLD AUTO: 0.37 10*3/MM3 (ref 0–0.4)
EOSINOPHIL NFR BLD AUTO: 5.7 % (ref 0.3–6.2)
ERYTHROCYTE [DISTWIDTH] IN BLOOD BY AUTOMATED COUNT: 12.8 % (ref 12.3–15.4)
GFR SERPL CREATININE-BSD FRML MDRD: 99 ML/MIN/1.73
GLOBULIN UR ELPH-MCNC: 3.2 GM/DL
GLUCOSE BLD-MCNC: 103 MG/DL (ref 65–99)
HCT VFR BLD AUTO: 36.4 % (ref 34–46.6)
HGB BLD-MCNC: 12 G/DL (ref 12–15.9)
IMM GRANULOCYTES # BLD AUTO: 0.01 10*3/MM3 (ref 0–0.05)
IMM GRANULOCYTES NFR BLD AUTO: 0.2 % (ref 0–0.5)
LIPASE SERPL-CCNC: 25 U/L (ref 13–60)
LYMPHOCYTES # BLD AUTO: 2.14 10*3/MM3 (ref 0.7–3.1)
LYMPHOCYTES NFR BLD AUTO: 33.1 % (ref 19.6–45.3)
MCH RBC QN AUTO: 27.7 PG (ref 26.6–33)
MCHC RBC AUTO-ENTMCNC: 33 G/DL (ref 31.5–35.7)
MCV RBC AUTO: 84.1 FL (ref 79–97)
MONOCYTES # BLD AUTO: 0.61 10*3/MM3 (ref 0.1–0.9)
MONOCYTES NFR BLD AUTO: 9.4 % (ref 5–12)
NEUTROPHILS # BLD AUTO: 3.29 10*3/MM3 (ref 1.7–7)
NEUTROPHILS NFR BLD AUTO: 50.8 % (ref 42.7–76)
NRBC BLD AUTO-RTO: 0 /100 WBC (ref 0–0.2)
PLATELET # BLD AUTO: 213 10*3/MM3 (ref 140–450)
PMV BLD AUTO: 10.6 FL (ref 6–12)
POTASSIUM BLD-SCNC: 4.1 MMOL/L (ref 3.5–5.2)
PROT SERPL-MCNC: 7.7 G/DL (ref 6–8.5)
RBC # BLD AUTO: 4.33 10*6/MM3 (ref 3.77–5.28)
SODIUM BLD-SCNC: 137 MMOL/L (ref 136–145)
WBC NRBC COR # BLD: 6.47 10*3/MM3 (ref 3.4–10.8)

## 2019-09-22 PROCEDURE — 85025 COMPLETE CBC W/AUTO DIFF WBC: CPT | Performed by: STUDENT IN AN ORGANIZED HEALTH CARE EDUCATION/TRAINING PROGRAM

## 2019-09-22 PROCEDURE — 71046 X-RAY EXAM CHEST 2 VIEWS: CPT

## 2019-09-22 PROCEDURE — 83690 ASSAY OF LIPASE: CPT | Performed by: STUDENT IN AN ORGANIZED HEALTH CARE EDUCATION/TRAINING PROGRAM

## 2019-09-22 PROCEDURE — 99283 EMERGENCY DEPT VISIT LOW MDM: CPT

## 2019-09-22 PROCEDURE — 80053 COMPREHEN METABOLIC PANEL: CPT | Performed by: STUDENT IN AN ORGANIZED HEALTH CARE EDUCATION/TRAINING PROGRAM

## 2019-09-22 PROCEDURE — 36415 COLL VENOUS BLD VENIPUNCTURE: CPT

## 2019-09-22 RX ORDER — LIDOCAINE HYDROCHLORIDE 20 MG/ML
15 SOLUTION OROPHARYNGEAL ONCE
Status: COMPLETED | OUTPATIENT
Start: 2019-09-22 | End: 2019-09-22

## 2019-09-22 RX ORDER — ALUMINA, MAGNESIA, AND SIMETHICONE 2400; 2400; 240 MG/30ML; MG/30ML; MG/30ML
15 SUSPENSION ORAL ONCE
Status: COMPLETED | OUTPATIENT
Start: 2019-09-22 | End: 2019-09-22

## 2019-09-22 RX ADMIN — LIDOCAINE HYDROCHLORIDE 15 ML: 20 SOLUTION ORAL; TOPICAL at 23:12

## 2019-09-22 RX ADMIN — ALUMINUM HYDROXIDE, MAGNESIUM HYDROXIDE, AND DIMETHICONE 15 ML: 400; 400; 40 SUSPENSION ORAL at 23:12

## 2019-09-23 VITALS
SYSTOLIC BLOOD PRESSURE: 128 MMHG | HEIGHT: 67 IN | HEART RATE: 88 BPM | RESPIRATION RATE: 18 BRPM | BODY MASS INDEX: 31.86 KG/M2 | TEMPERATURE: 98.3 F | OXYGEN SATURATION: 97 % | DIASTOLIC BLOOD PRESSURE: 87 MMHG | WEIGHT: 203 LBS

## 2019-09-23 RX ORDER — CLARITHROMYCIN 500 MG/1
500 TABLET, COATED ORAL 2 TIMES DAILY
Qty: 14 TABLET | Refills: 0 | Status: SHIPPED | OUTPATIENT
Start: 2019-09-23 | End: 2020-09-21

## 2019-09-23 RX ORDER — OMEPRAZOLE 40 MG/1
40 CAPSULE, DELAYED RELEASE ORAL DAILY
Qty: 14 CAPSULE | Refills: 0 | Status: SHIPPED | OUTPATIENT
Start: 2019-09-23 | End: 2020-09-21

## 2019-09-23 RX ORDER — METRONIDAZOLE 500 MG/1
500 TABLET ORAL 2 TIMES DAILY
Qty: 14 TABLET | Refills: 0 | Status: SHIPPED | OUTPATIENT
Start: 2019-09-23 | End: 2020-09-21

## 2019-09-23 NOTE — ED PROVIDER NOTES
Subjective   23-year-old female that presents with concerns for epigastric pain is been ongoing since yesterday.  Patient reports it is worse after she eats.  She states it is a 7 out of 10, constant and burning currently.  She thought was heartburn initially but she is never had anything hurt this bad.            Review of Systems   All other systems reviewed and are negative.      Past Medical History:   Diagnosis Date   • Anxiety    • Asthma     ALLERGY RELATED    • Depression    • Hypertension     GHTN with 1st pregnancy   • Subclinical hyperthyroidism 2016    on medication till 12/2016       Allergies   Allergen Reactions   • Keflex [Cephalexin] Hives       Past Surgical History:   Procedure Laterality Date   • KNEE SURGERY  10/2018    right   • WISDOM TOOTH EXTRACTION         Family History   Problem Relation Age of Onset   • Diabetes Brother    • Diabetes Maternal Grandfather    • Hyperlipidemia Father    • Thyroid disease Paternal Aunt    • Cancer Paternal Grandmother    • Thyroid disease Paternal Grandmother        Social History     Socioeconomic History   • Marital status:      Spouse name: Not on file   • Number of children: Not on file   • Years of education: Not on file   • Highest education level: Not on file   Tobacco Use   • Smoking status: Never Smoker   • Smokeless tobacco: Never Used   Substance and Sexual Activity   • Alcohol use: No   • Drug use: No   • Sexual activity: Yes     Partners: Male     Birth control/protection: None           Objective   Physical Exam   Nursing note and vitals reviewed.      GEN: No acute distress  Head: Normocephalic, atraumatic  Eyes: Pupils equal round reactive to light  ENT: Posterior pharynx normal in appearance, oral mucosa is moist  Chest: Nontender to palpation  Cardiovascular: Regular rate  Lungs: Clear to auscultation bilaterally  Abdomen: Soft, tender to palpation in epigastrium otherwise nontender, nondistended, no peritoneal signs  Extremities: No  edema, normal appearance  Neuro: GCS 15  Psych: Mood and affect are appropriate      Procedures           ED Course                  MDM  Number of Diagnoses or Management Options  Epigastric pain:   Diagnosis management comments: Different diagnosis for this patient would include cholecystitis, cholelithiasis, pancreatitis, gastritis, GERD, gastroparesis, hiatal hernia, or other concerns.  Treated with a GI cocktail with symptom resolution  No significant laboratory abnormalities  We will treat with triple therapy for H. pylori and instructed to follow-up       Amount and/or Complexity of Data Reviewed  Clinical lab tests: reviewed  Tests in the radiology section of CPT®: reviewed  Decide to obtain previous medical records or to obtain history from someone other than the patient: yes  Obtain history from someone other than the patient: yes  Independent visualization of images, tracings, or specimens: yes        Final diagnoses:   Epigastric pain              Vito Chacon MD  09/23/19 0028

## 2020-09-21 ENCOUNTER — OFFICE VISIT (OUTPATIENT)
Dept: ENDOCRINOLOGY | Facility: CLINIC | Age: 25
End: 2020-09-21

## 2020-09-21 VITALS
SYSTOLIC BLOOD PRESSURE: 120 MMHG | HEART RATE: 77 BPM | BODY MASS INDEX: 32.33 KG/M2 | DIASTOLIC BLOOD PRESSURE: 60 MMHG | OXYGEN SATURATION: 98 % | HEIGHT: 67 IN | WEIGHT: 206 LBS

## 2020-09-21 DIAGNOSIS — E05.90 HYPERTHYROIDISM: Primary | ICD-10-CM

## 2020-09-21 PROCEDURE — 99203 OFFICE O/P NEW LOW 30 MIN: CPT | Performed by: INTERNAL MEDICINE

## 2020-09-21 RX ORDER — METHIMAZOLE 5 MG/1
TABLET ORAL 3 TIMES DAILY
COMMUNITY
End: 2020-10-01 | Stop reason: SDUPTHER

## 2020-09-25 ENCOUNTER — LAB (OUTPATIENT)
Dept: LAB | Facility: HOSPITAL | Age: 25
End: 2020-09-25

## 2020-09-25 DIAGNOSIS — E05.90 HYPERTHYROIDISM: ICD-10-CM

## 2020-09-25 LAB
T3 SERPL-MCNC: 128 NG/DL (ref 80–200)
T4 FREE SERPL-MCNC: 0.94 NG/DL (ref 0.93–1.7)
TSH SERPL DL<=0.05 MIU/L-ACNC: 0.15 UIU/ML (ref 0.27–4.2)

## 2020-09-25 PROCEDURE — 84443 ASSAY THYROID STIM HORMONE: CPT

## 2020-09-25 PROCEDURE — 36415 COLL VENOUS BLD VENIPUNCTURE: CPT

## 2020-09-25 PROCEDURE — 84480 ASSAY TRIIODOTHYRONINE (T3): CPT

## 2020-09-25 PROCEDURE — 83520 IMMUNOASSAY QUANT NOS NONAB: CPT

## 2020-09-25 PROCEDURE — 84439 ASSAY OF FREE THYROXINE: CPT

## 2020-09-29 LAB — TSH RECEP AB SER-ACNC: <1.1 IU/L (ref 0–1.75)

## 2020-10-01 RX ORDER — METHIMAZOLE 5 MG/1
10 TABLET ORAL DAILY
Qty: 60 TABLET | Refills: 2 | Status: SHIPPED | OUTPATIENT
Start: 2020-10-01 | End: 2021-01-27

## 2020-10-02 ENCOUNTER — TELEPHONE (OUTPATIENT)
Dept: ENDOCRINOLOGY | Facility: CLINIC | Age: 25
End: 2020-10-02

## 2020-10-02 NOTE — TELEPHONE ENCOUNTER
Spoke to patient about lab results. See clinic note from 09/21/2020 for details.   Signed: Evelin Mahmood MD

## 2020-10-04 NOTE — PROGRESS NOTES
Chief Complaint   Patient presents with   • Hyperthyroidism     New patient referred by Carlos King PA-C,     HPI:   Alexandria Stock is a 24 y.o.female who presents to endocrine clinic for further evaluation and management of recently found hyperthyroidism.  She was last seen by me in clinic on 03/08/2017 gestational transient thyrotoxicosis.  Her history is as follows:    1) hyperthyroidism:  -Patient reports increased anxiety that began approximately a year ago.    -She reports presenting to her PCP in 07/2020 with complaints of mood swings, heat intolerance, palpitations, increased anxiety, and weight gain the 5 to 6 months prior.    Outside Labs: (07/20/2020) - pt not on Biotin or estrogen when labs collected  TSH 0.01  Free T4 1.90 (0.80 - 1.90)  Free T3 6.9 (2.2 - 4.2)  TPO Ab 145  TG Ab 118  TSI% or TSH receptor antibody was not checked at that time    PCP prescribed metoprolol 25 mg daily and methimazole 5 mg 3 times daily she has been taking since July 2020.  She reports after starting medication having an improvement in her palpitations and heat intolerance.  Continues to have anxiety.  She reports losing 15 pounds after starting the medications.     FMHx: paternal grandmother had hyperthyroidism, paternal aunt hypothyroidism.    2) h/o hyperthyroidism during pregnancy (gestational transient thyrotoxicosis):   - pt was found to have a slightly decreased TSH in march and April of 2016 at her PCP's office. Pt was asymptomatic at that time.   3/2016: TSH 0.13 (0.40 - 4.50), free T4 1.1 (normal), free T3 3.9 (normal)  4/2016: TSH 0.21 (0.40 - 4.50)  In late may, early June 2016 she was evaluated for her first pregnancy. She reports feeling very ill during the first trimester. She had symptoms of significant nausea, palpitations, increased anxiety and depression, and significant myalgias.  On 6/24/2016 labs were c/w subclinical hyperthyroidism: TSH <0.004, free T4 normal at 1.32, free T3 normal at 4.3  Because of  her symptoms, she was prescribed methimazole 5 mg TID at 13 weeks. She reported feeling better after the medication was initiated.  - In 12/2016, initial endocrine visit, checked TSH receptor antibody which was normal. I instructed her to stop the methimazole at that time.   - By 03/2017, approx 6 weeks postpartum, TFT's were normal  - In 08/2017, pt had post-partum thyroiditis. TFTs returned to normal a oziel weeks later per patient.       Review of Systems   Constitutional: Positive for fatigue.        Weight gain   HENT: Negative.    Eyes: Negative.    Respiratory: Negative.    Cardiovascular: Positive for palpitations (improving).   Gastrointestinal: Negative.  Negative for abdominal pain, constipation and diarrhea.   Endocrine: Positive for heat intolerance (improving).   Genitourinary: Negative.    Musculoskeletal: Positive for myalgias.   Skin:        Hair loss   Allergic/Immunologic: Negative.    Neurological: Negative.    Hematological: Negative.    Psychiatric/Behavioral:        Anxiety     Past Medical History:   Diagnosis Date   • Anxiety    • Asthma     ALLERGY RELATED    • Depression    • Depression affecting pregnancy in second trimester, antepartum 7/22/2016    Counseling/Psych info given Symptoms resolved once hyperthyroidism treated   • Hypertension     GHTN with 1st pregnancy   • Normal delivery 1/27/2017   • Postpartum care following vaginal delivery 1/27/2017   • Subclinical hyperthyroidism 2016    on medication till 12/2016     Family History   Problem Relation Age of Onset   • Diabetes Brother    • Diabetes Maternal Grandfather    • Hyperlipidemia Father    • Thyroid disease Paternal Aunt    • Cancer Paternal Grandmother    • Thyroid disease Paternal Grandmother      Social History     Socioeconomic History   • Marital status:      Spouse name: Not on file   • Number of children: Not on file   • Years of education: Not on file   • Highest education level: Not on file   Tobacco Use   •  "Smoking status: Never Smoker   • Smokeless tobacco: Never Used   Substance and Sexual Activity   • Alcohol use: No   • Drug use: No   • Sexual activity: Yes     Partners: Male     Birth control/protection: None       /60   Pulse 77   Ht 170.2 cm (67\")   Wt 93.4 kg (206 lb)   SpO2 98%   BMI 32.26 kg/m²   Physical Exam   Constitutional: She is oriented to person, place, and time. She appears well-developed. No distress.   HENT:   Head: Normocephalic.   Eyes: Pupils are equal, round, and reactive to light. Conjunctivae are normal.   Neck: No tracheal deviation present. No thyromegaly present.   No palpable thyroid nodules     Cardiovascular: Normal rate, regular rhythm and normal heart sounds.   No murmur heard.  Pulmonary/Chest: Effort normal and breath sounds normal. No respiratory distress.   Abdominal: Soft. Bowel sounds are normal. There is no abdominal tenderness.   Lymphadenopathy:     She has no cervical adenopathy.   Neurological: She is alert and oriented to person, place, and time. No cranial nerve deficit.   Skin: Skin is warm and dry. She is not diaphoretic. No erythema.   Psychiatric: Her behavior is normal.   Vitals reviewed.    LABS/IMAGING: outside records reviewed and summarized in HPI  Labs completed on 09/25/2020: on methimazole 5 mg TID since 07/2020  TSH - 0.153 (0.270 - 4.200)  Free T4 0.94 (0.93 - 1.70)  Total T3 128.0 (80 - 200)  TSH Receptor Ab: < 1.10 (0 - 1.75)    ASSESSMENT/PLAN:  1) hyperthyroidism:  - clinically euthyroid on exam today but with some symptoms of mild anxiety, arthralgias, and fatigue  -Discussed with patient that the etiology of her hyperthyroidism is not clear at this time.  The presumption is Graves' disease, however, patient was prescribed methimazole before TSH receptor antibody or TSI percent was checked.  Patient has a normal TSH receptor antibody today, however. the this may be due to her 2-month treatment with methimazole.  -Briefly looked at her thyroid " gland with in clinic ultrasound which showed a heterogeneous slightly hypoechoic gland with increased vascularity.  No dominant nodule was visualized. Discussed with this patient that the ultrasound appearance of her gland is nonspecific and can be seen in thyroiditis or Graves' disease.   -We will decrease methimazole to 10 mg daily.  Patient to continue with metoprolol 25 mg daily for now but advised her to stop the medication when her palpitations resolve.  -Patient to have TFTs checked in 2 months in December 1st.  Will adjust methimazole at that time.    Patient scheduled to return to clinic on January 13, 2021.     Signed: Evelin Mahmood MD      RTC as needed

## 2021-01-27 ENCOUNTER — OFFICE VISIT (OUTPATIENT)
Dept: OBSTETRICS AND GYNECOLOGY | Facility: CLINIC | Age: 26
End: 2021-01-27

## 2021-01-27 VITALS
BODY MASS INDEX: 30.92 KG/M2 | SYSTOLIC BLOOD PRESSURE: 124 MMHG | DIASTOLIC BLOOD PRESSURE: 74 MMHG | HEIGHT: 67 IN | WEIGHT: 197 LBS

## 2021-01-27 DIAGNOSIS — R10.2 PELVIC CRAMPING: ICD-10-CM

## 2021-01-27 DIAGNOSIS — N93.9 ABNORMAL UTERINE BLEEDING (AUB): Primary | ICD-10-CM

## 2021-01-27 DIAGNOSIS — N92.6 IRREGULAR MENSES: ICD-10-CM

## 2021-01-27 PROCEDURE — 99204 OFFICE O/P NEW MOD 45 MIN: CPT | Performed by: OBSTETRICS & GYNECOLOGY

## 2021-01-27 RX ORDER — NORGESTIMATE AND ETHINYL ESTRADIOL 0.25-0.035
1 KIT ORAL DAILY
Qty: 28 TABLET | Refills: 0 | Status: SHIPPED | OUTPATIENT
Start: 2021-01-27 | End: 2021-02-19

## 2021-01-28 LAB
BASOPHILS # BLD AUTO: 0.04 10*3/MM3 (ref 0–0.2)
BASOPHILS NFR BLD AUTO: 0.5 % (ref 0–1.5)
EOSINOPHIL # BLD AUTO: 0.12 10*3/MM3 (ref 0–0.4)
EOSINOPHIL NFR BLD AUTO: 1.6 % (ref 0.3–6.2)
ERYTHROCYTE [DISTWIDTH] IN BLOOD BY AUTOMATED COUNT: 12.8 % (ref 12.3–15.4)
HCG INTACT+B SERPL-ACNC: <0.5 MIU/ML
HCT VFR BLD AUTO: 38.5 % (ref 34–46.6)
HGB BLD-MCNC: 13.2 G/DL (ref 12–15.9)
IMM GRANULOCYTES # BLD AUTO: 0.03 10*3/MM3 (ref 0–0.05)
IMM GRANULOCYTES NFR BLD AUTO: 0.4 % (ref 0–0.5)
LYMPHOCYTES # BLD AUTO: 2.36 10*3/MM3 (ref 0.7–3.1)
LYMPHOCYTES NFR BLD AUTO: 31.8 % (ref 19.6–45.3)
MCH RBC QN AUTO: 28.4 PG (ref 26.6–33)
MCHC RBC AUTO-ENTMCNC: 34.3 G/DL (ref 31.5–35.7)
MCV RBC AUTO: 83 FL (ref 79–97)
MONOCYTES # BLD AUTO: 0.66 10*3/MM3 (ref 0.1–0.9)
MONOCYTES NFR BLD AUTO: 8.9 % (ref 5–12)
NEUTROPHILS # BLD AUTO: 4.2 10*3/MM3 (ref 1.7–7)
NEUTROPHILS NFR BLD AUTO: 56.8 % (ref 42.7–76)
NRBC BLD AUTO-RTO: 0.1 /100 WBC (ref 0–0.2)
PLATELET # BLD AUTO: 252 10*3/MM3 (ref 140–450)
RBC # BLD AUTO: 4.64 10*6/MM3 (ref 3.77–5.28)
TSH SERPL DL<=0.005 MIU/L-ACNC: 0.76 UIU/ML (ref 0.27–4.2)
WBC # BLD AUTO: 7.41 10*3/MM3 (ref 3.4–10.8)

## 2021-01-28 NOTE — PROGRESS NOTES
GYN Office Visit    Subjective   Chief Complaint   Patient presents with   • Vaginal Bleeding     Irregular and heavy bleeding, TVS done today.     Alexandria Stock is a 25 y.o. year old  presenting to be seen for irregular and heavy vaginal bleeding.    She reports starting her period 3 days late in December.  She had 3 - urine pregnancy test at that time.  She then started a period on  and has had some form/level of bleeding every day since.  Most days it is spotting, but she will occasionally have heavier/period type bleeding as well.  She does have bilateral lower quadrant cramping as well.  No pregnancy testing since December.  History of thyroid issue, but no current medication.  Prior to this episode, she had very regular, monthly menses lasting 5-7 days with moderate/heavy flow.    OB Hx: 3 vaginal births  Pap smear: 2019, denies cervical dysplasia history  Contraception: Condoms  Mammogram: Never  Colonoscopy: Never  DEXA Scan: Never    Past Medical History:   Diagnosis Date   • Anxiety    • Asthma     ALLERGY RELATED    • Depression    • Depression affecting pregnancy in second trimester, antepartum 2016    Counseling/Psych info given Symptoms resolved once hyperthyroidism treated   • Hypertension     GHTN with 1st pregnancy   • Migraine    • Normal delivery 2017   • Ovarian cyst    • Postpartum care following vaginal delivery 2017   • Subclinical hyperthyroidism 2016    on medication till 2016   • Urinary tract infection        Past Surgical History:   Procedure Laterality Date   • KNEE SURGERY  10/2018    right   • WISDOM TOOTH EXTRACTION         Family History   Problem Relation Age of Onset   • Diabetes Brother    • Diabetes Maternal Grandfather    • Hyperlipidemia Father    • Thyroid disease Paternal Aunt    • Cancer Paternal Grandmother    • Thyroid disease Paternal Grandmother    • Breast cancer Maternal Aunt         Social History     Tobacco Use   • Smoking  "status: Never Smoker   • Smokeless tobacco: Never Used   Substance Use Topics   • Alcohol use: No   • Drug use: No       (Not in a hospital admission)      Keflex [cephalexin]    No current outpatient medications on file prior to visit.     No current facility-administered medications on file prior to visit.        Social History    Tobacco Use      Smoking status: Never Smoker      Smokeless tobacco: Never Used         Objective   /74   Ht 170.2 cm (67\")   Wt 89.4 kg (197 lb)   LMP 12/31/2020   BMI 30.85 kg/m²     Physical Exam:  General Appearance: alert, pleasant, appears stated age, interactive and cooperative  Breasts: Not performed.  Abdomen: no masses, no hepatomegaly, no splenomegaly, soft non-tender, no guarding and no rebound tenderness  Pelvis:  Pelvic: Clinical staff was present for exam  External genitalia:  normal appearance of the external genitalia including Bartholin's and Lyerly's glands.  :  urethral meatus normal;  Vagina:  normal pink mucosa without prolapse or lesions.  Cervix:  normal appearance.  Uterus:  normal size, shape and consistency.  Adnexa:  normal bimanual exam of the adnexa.  Rectal:  digital rectal exam not performed; anus visually normal appearing.         Medical Decision Making:    Pelvic ultrasound:  Normal sized, anteverted uterus with no masses.  The endometrium measures roughly 17.6 mm.  The right ovary is normal in appearance.  The left ovary contains a roughly 2.8 cm simple follicle/cyst.  Small moderate free fluid in the cul-de-sac.    Assessment   Abnormal uterine bleeding  Irregular menses  History of hypothyroidism  Pelvic cramping     Plan    Orders Placed This Encounter   Procedures   • NuSwab VG+ - Swab, Cervix   • US Non-ob Transvaginal     Order Specific Question:   Reason for Exam:     Answer:   irreg menses   • TSH     Order Specific Question:   [facility] Has the patient fasted?     Answer:   No   • HCG, B-subunit, Quantitative ([facility] Only)     Order " Specific Question:   [facility] Has the patient fasted?     Answer:   No   • CBC & Differential     Order Specific Question:   Manual Differential     Answer:   No     Order Specific Question:   [facility] Has the patient fasted?     Answer:   No       Medication Management: Sprintec x1 pack.    Procedures Performed: None    We reviewed her symptoms, exam findings and imaging in detail.  Reassuring exam.  Lab work as above.  If blood work reassuring, we will stabilize the endometrium with commendation OCPs for 1 month.  She and her partner may want to attempt pregnancy in the near future.    RTC as needed    Dylan Munoz MD  Obstetrics and Gynecology  [facility]

## 2021-01-29 LAB
A VAGINAE DNA VAG QL NAA+PROBE: NORMAL SCORE
BVAB2 DNA VAG QL NAA+PROBE: NORMAL SCORE
C ALBICANS DNA VAG QL NAA+PROBE: NEGATIVE
C GLABRATA DNA VAG QL NAA+PROBE: NEGATIVE
C TRACH DNA VAG QL NAA+PROBE: NEGATIVE
MEGA1 DNA VAG QL NAA+PROBE: NORMAL SCORE
N GONORRHOEA DNA VAG QL NAA+PROBE: NEGATIVE
T VAGINALIS DNA VAG QL NAA+PROBE: NEGATIVE

## 2021-02-19 DIAGNOSIS — N93.9 ABNORMAL UTERINE BLEEDING (AUB): ICD-10-CM

## 2021-02-19 RX ORDER — NORGESTIMATE AND ETHINYL ESTRADIOL 0.25-0.035
KIT ORAL
Qty: 28 TABLET | Refills: 0 | Status: SHIPPED | OUTPATIENT
Start: 2021-02-19 | End: 2022-02-28

## 2021-02-24 ENCOUNTER — OFFICE VISIT (OUTPATIENT)
Dept: OBSTETRICS AND GYNECOLOGY | Facility: CLINIC | Age: 26
End: 2021-02-24

## 2021-02-24 VITALS
WEIGHT: 199 LBS | SYSTOLIC BLOOD PRESSURE: 132 MMHG | HEIGHT: 67 IN | DIASTOLIC BLOOD PRESSURE: 88 MMHG | BODY MASS INDEX: 31.23 KG/M2

## 2021-02-24 DIAGNOSIS — Z30.011 VISIT FOR ORAL CONTRACEPTIVE PRESCRIPTION: ICD-10-CM

## 2021-02-24 DIAGNOSIS — Z01.419 WOMEN'S ANNUAL ROUTINE GYNECOLOGICAL EXAMINATION: Primary | ICD-10-CM

## 2021-02-24 LAB
B-HCG UR QL: NEGATIVE
INTERNAL NEGATIVE CONTROL: NEGATIVE
INTERNAL POSITIVE CONTROL: POSITIVE
Lab: NORMAL

## 2021-02-24 PROCEDURE — 81025 URINE PREGNANCY TEST: CPT | Performed by: OBSTETRICS & GYNECOLOGY

## 2021-02-24 PROCEDURE — 99395 PREV VISIT EST AGE 18-39: CPT | Performed by: OBSTETRICS & GYNECOLOGY

## 2021-02-24 RX ORDER — MELOXICAM 15 MG/1
15 TABLET ORAL DAILY PRN
COMMUNITY
Start: 2021-02-03 | End: 2022-02-28

## 2021-03-04 DIAGNOSIS — Z01.419 WOMEN'S ANNUAL ROUTINE GYNECOLOGICAL EXAMINATION: ICD-10-CM

## 2022-02-28 ENCOUNTER — OFFICE VISIT (OUTPATIENT)
Dept: ENDOCRINOLOGY | Facility: CLINIC | Age: 27
End: 2022-02-28

## 2022-02-28 VITALS
HEIGHT: 67 IN | SYSTOLIC BLOOD PRESSURE: 126 MMHG | BODY MASS INDEX: 31.86 KG/M2 | OXYGEN SATURATION: 98 % | WEIGHT: 203 LBS | HEART RATE: 63 BPM | DIASTOLIC BLOOD PRESSURE: 76 MMHG

## 2022-02-28 DIAGNOSIS — E05.90 HYPERTHYROIDISM COMPLICATING PREGNANCY IN SECOND TRIMESTER: Primary | ICD-10-CM

## 2022-02-28 DIAGNOSIS — O99.282 HYPERTHYROIDISM COMPLICATING PREGNANCY IN SECOND TRIMESTER: Primary | ICD-10-CM

## 2022-02-28 PROCEDURE — 99213 OFFICE O/P EST LOW 20 MIN: CPT | Performed by: INTERNAL MEDICINE

## 2022-02-28 RX ORDER — PRENATAL VIT NO.126/IRON/FOLIC 28MG-0.8MG
TABLET ORAL DAILY
COMMUNITY

## 2022-03-28 ENCOUNTER — LAB (OUTPATIENT)
Dept: LAB | Facility: HOSPITAL | Age: 27
End: 2022-03-28

## 2022-03-28 DIAGNOSIS — O99.282 HYPERTHYROIDISM COMPLICATING PREGNANCY IN SECOND TRIMESTER: ICD-10-CM

## 2022-03-28 DIAGNOSIS — E05.90 HYPERTHYROIDISM COMPLICATING PREGNANCY IN SECOND TRIMESTER: ICD-10-CM

## 2022-03-28 LAB
DEPRECATED RDW RBC AUTO: 40.6 FL (ref 37–54)
ERYTHROCYTE [DISTWIDTH] IN BLOOD BY AUTOMATED COUNT: 12.8 % (ref 12.3–15.4)
HCT VFR BLD AUTO: 34.8 % (ref 34–46.6)
HGB BLD-MCNC: 11.4 G/DL (ref 12–15.9)
IRON 24H UR-MRATE: 115 MCG/DL (ref 37–145)
IRON SATN MFR SERPL: 20 % (ref 20–50)
MCH RBC QN AUTO: 28.2 PG (ref 26.6–33)
MCHC RBC AUTO-ENTMCNC: 32.8 G/DL (ref 31.5–35.7)
MCV RBC AUTO: 86.1 FL (ref 79–97)
PLATELET # BLD AUTO: 246 10*3/MM3 (ref 140–450)
PMV BLD AUTO: 10.5 FL (ref 6–12)
RBC # BLD AUTO: 4.04 10*6/MM3 (ref 3.77–5.28)
T4 SERPL-MCNC: 13.7 MCG/DL (ref 4.5–11.7)
TIBC SERPL-MCNC: 589 MCG/DL (ref 298–536)
TRANSFERRIN SERPL-MCNC: 395 MG/DL (ref 200–360)
TSH SERPL DL<=0.05 MIU/L-ACNC: 0.02 UIU/ML (ref 0.27–4.2)
WBC NRBC COR # BLD: 5.63 10*3/MM3 (ref 3.4–10.8)

## 2022-03-28 PROCEDURE — 84445 ASSAY OF TSI GLOBULIN: CPT

## 2022-03-28 PROCEDURE — 84466 ASSAY OF TRANSFERRIN: CPT

## 2022-03-28 PROCEDURE — 85027 COMPLETE CBC AUTOMATED: CPT

## 2022-03-28 PROCEDURE — 83540 ASSAY OF IRON: CPT

## 2022-03-28 PROCEDURE — 83520 IMMUNOASSAY QUANT NOS NONAB: CPT

## 2022-03-28 PROCEDURE — 84436 ASSAY OF TOTAL THYROXINE: CPT

## 2022-03-28 PROCEDURE — 84443 ASSAY THYROID STIM HORMONE: CPT

## 2022-03-30 LAB
TSH RECEP AB SER-ACNC: 4.61 IU/L (ref 0–1.75)
TSI SER-ACNC: 4.61 IU/L (ref 0–0.55)

## 2022-04-08 ENCOUNTER — TELEPHONE (OUTPATIENT)
Dept: ENDOCRINOLOGY | Facility: CLINIC | Age: 27
End: 2022-04-08

## 2022-04-08 NOTE — TELEPHONE ENCOUNTER
Spoke to patient about lab results. Pt with subclinical hyperthyroidism due to Graves disease during pregnancy. Pt currently 19 weeks gestation. No medication at this time. Will continue to monitor TFT's closely.    Signed: Evelin Mahmood MD

## 2022-04-08 NOTE — TELEPHONE ENCOUNTER
----- Message from Alexandria Stock sent at 4/7/2022 12:00 PM EDT -----  Regarding: Lab Work Results  Hi Dr. Mahmood,  Just wanted to check in and see how my lab results were from my lab draw on 3/28/22. My next OB appointment is 4/13/22, just wanted to speak with you before then.

## 2022-04-13 ENCOUNTER — TRANSCRIBE ORDERS (OUTPATIENT)
Dept: OBSTETRICS AND GYNECOLOGY | Facility: HOSPITAL | Age: 27
End: 2022-04-13

## 2022-04-13 DIAGNOSIS — E05.90 HYPERTHYROIDISM DURING PREGNANCY: ICD-10-CM

## 2022-04-13 DIAGNOSIS — Z87.59 HISTORY OF GESTATIONAL HYPERTENSION: ICD-10-CM

## 2022-04-13 DIAGNOSIS — E05.00 GRAVES DISEASE: Primary | ICD-10-CM

## 2022-04-13 DIAGNOSIS — O99.280 HYPERTHYROIDISM DURING PREGNANCY: ICD-10-CM

## 2022-04-28 ENCOUNTER — HOSPITAL ENCOUNTER (OUTPATIENT)
Dept: WOMENS IMAGING | Facility: HOSPITAL | Age: 27
Discharge: HOME | End: 2022-04-28
Admitting: NURSE PRACTITIONER

## 2022-04-28 ENCOUNTER — OFFICE VISIT (OUTPATIENT)
Dept: OBSTETRICS AND GYNECOLOGY | Facility: HOSPITAL | Age: 27
End: 2022-04-28

## 2022-04-28 VITALS — SYSTOLIC BLOOD PRESSURE: 117 MMHG | DIASTOLIC BLOOD PRESSURE: 71 MMHG | WEIGHT: 208 LBS | BODY MASS INDEX: 32.58 KG/M2

## 2022-04-28 DIAGNOSIS — E05.00 GRAVES DISEASE: ICD-10-CM

## 2022-04-28 DIAGNOSIS — E05.00 GRAVES DISEASE: Primary | ICD-10-CM

## 2022-04-28 DIAGNOSIS — E05.90 HYPERTHYROIDISM DURING PREGNANCY: ICD-10-CM

## 2022-04-28 DIAGNOSIS — O99.280 HYPERTHYROIDISM DURING PREGNANCY: ICD-10-CM

## 2022-04-28 DIAGNOSIS — Z87.59 HISTORY OF GESTATIONAL HYPERTENSION: ICD-10-CM

## 2022-04-28 PROCEDURE — 99215 OFFICE O/P EST HI 40 MIN: CPT | Performed by: OBSTETRICS & GYNECOLOGY

## 2022-04-28 PROCEDURE — 76811 OB US DETAILED SNGL FETUS: CPT | Performed by: OBSTETRICS & GYNECOLOGY

## 2022-04-28 PROCEDURE — 76811 OB US DETAILED SNGL FETUS: CPT

## 2022-04-28 NOTE — PROGRESS NOTES
Patient seen in Maternal Fetal Medicine clinic today. Please see full note in under imaging tab of patient chart in Epic (Viewpoint report).    Kika Crockett MD

## 2022-04-28 NOTE — PROGRESS NOTES
"Pt denies problems with pregnancy.  Next OB appt 5/11/22.  Endocrinology managing thyroid disease with next appt 5/16/22.  Pt reports \"DS test was negative and a girl.\"  "

## 2022-05-16 ENCOUNTER — OFFICE VISIT (OUTPATIENT)
Dept: ENDOCRINOLOGY | Facility: CLINIC | Age: 27
End: 2022-05-16

## 2022-05-16 VITALS
HEART RATE: 99 BPM | OXYGEN SATURATION: 98 % | BODY MASS INDEX: 33.59 KG/M2 | HEIGHT: 67 IN | DIASTOLIC BLOOD PRESSURE: 66 MMHG | WEIGHT: 214 LBS | SYSTOLIC BLOOD PRESSURE: 120 MMHG

## 2022-05-16 DIAGNOSIS — E05.00 GRAVES' DISEASE: ICD-10-CM

## 2022-05-16 DIAGNOSIS — O99.282 HYPERTHYROIDISM AFFECTING PREGNANCY IN SECOND TRIMESTER: Primary | ICD-10-CM

## 2022-05-16 DIAGNOSIS — E05.90 HYPERTHYROIDISM AFFECTING PREGNANCY IN SECOND TRIMESTER: Primary | ICD-10-CM

## 2022-05-16 PROCEDURE — 99213 OFFICE O/P EST LOW 20 MIN: CPT | Performed by: INTERNAL MEDICINE

## 2022-05-16 NOTE — PROGRESS NOTES
Chief Complaint   Patient presents with   • Hyperthyroidism     Follow up 25 weeks     HPI:   Alexandria Stock is a 26 y.o.female who presents to endocrine clinic for further evaluation of Graves disease during pregnancy. Last visit 02/28/2022. Her history is as follows:    Interim Events:  - pt is currently 24w5d gestation. OB-Gyn Josselin Hutchinson CNM  - Is being followed by [facility] as well. Last Fetal US 04/28/2022 - no abnormalities.      1) subclinical hyperthyroidism due to Graves disease during pregnancy:  - pt is currently 24w5d gestation  - Recent Labs:  (05/11/2022) TSH <0.015, free T4 1.17 (0.78 - 2.19)      (02/28/2022) Saw pt at 14 weeks gestation. (Fourth pregnancy)  Labs at OB office: (02/12/2022) TSH < 0.015, free T4 1.58 (0.78 - 2.19). [ pt on prenatal vitamin. No high dose biotin supplement]  -Discussed with patient that per American thyroid Association guidelines, treatment with thionamides is not indicated for women with subclinical hyperthyroidism during pregnancy.  -Discussed that if she develops overt hyperthyroidism with moderate to severe symptoms, a thionamide will be prescribed.   -The plan was to monitor her thyroid function test closely and check for TSH receptor antibodies.   (03/28/2022) TSH Receptor Ab 4.61 (0 - 1.75, TSI 4.61 (0 - 0.55)       Currently with occasional palpitations at rest or with activity. Denies N/V/D or wt loss.      Prior History:  1) (2016 - 2017) h/o hyperthyroidism during pregnancy (gestational transient thyrotoxicosis):   - (04/2016) pt was found to have a slightly decreased TSH in 03/2016 and 04/2016 at her PCP's office. Pt was asymptomatic at that time.   3/2016: TSH 0.13 (0.40 - 4.50), free T4 1.1 (normal), free T3 3.9 (normal)  4/2016: TSH 0.21 (0.40 - 4.50)  In early June 2016 she was evaluated for her first pregnancy. She reports feeling very ill during the first trimester. She had symptoms of significant nausea, palpitations, increased anxiety and  depression, and significant myalgias.  On 6/24/2016 labs were c/w subclinical hyperthyroidism: TSH <0.004, free T4 normal at 1.32, free T3 normal at 4.3  Because of her symptoms, she was prescribed methimazole 5 mg TID at 13 weeks. She reported feeling better after the medication was initiated.  - In 12/2016, initial endocrine visit, checked TSH receptor antibody which was normal. I instructed her to stop the methimazole at that time.   - By 03/2017, approx 6 weeks postpartum, TFT's were normal  - In 08/2017, pt had post-partum thyroiditis. TFTs returned to normal a few weeks later per patient.    2) (07/2020 - 09/2020)  h/o intermittent hyperthyroidism: (not pregnant)  -She presented to her PCP in 07/2020 with complaints of mood swings, heat intolerance, palpitations, increased anxiety, and weight gain the 5 to 6 months prior.  Outside Labs: (07/20/2020) - pt not on Biotin or estrogen when labs collected  TSH 0.01, Free T4 1.90 (0.80 - 1.90) ,Free T3 6.9 (2.2 - 4.2), TPO Ab 145, TG Ab 118  TSI% or TSH receptor antibody was not checked at that time  - PCP prescribed metoprolol 25 mg daily and methimazole 5 mg TID in July 2020. I saw pt in 09/2020. Briefly looked at her thyroid gland with in clinic ultrasound which showed a heterogeneous slightly hypoechoic gland with increased vascularity.  No dominant nodule was visualized. Labs in 0/92020 showed a negative TSH Receptor Ab after 2 months of methimazole treatment. Pt stopped her methimazole and metoprolol at some point on her own. Did not follow-up in 01/2021. Pt reported feeling okay after stopping these medications     FMHx: paternal grandmother had hyperthyroidism, paternal aunt hypothyroidism.    Review of Systems   Constitutional: Positive for fatigue. Negative for diaphoresis.        Expected wt increase   HENT: Negative.    Eyes: Negative.    Respiratory: Negative.    Cardiovascular: Positive for palpitations (intermittent).   Gastrointestinal: Negative for  "abdominal pain, constipation, diarrhea, nausea and vomiting.   Endocrine: Negative for heat intolerance.   Genitourinary: Negative.    Musculoskeletal: Negative.  Negative for myalgias.   Allergic/Immunologic: Negative.    Neurological: Negative.    Hematological: Negative.    Psychiatric/Behavioral:        H/o anxiety       The following portions of the patient's history were reviewed and updated as appropriate: allergies, current medications, past family history, past medical history, past social history, past surgical history and problem list.      /66   Pulse 99   Ht 170.2 cm (67\")   Wt 97.1 kg (214 lb)   LMP 11/25/2021   SpO2 98%   BMI 33.52 kg/m²   Physical Exam   Constitutional: She is oriented to person, place, and time. She appears well-developed. No distress.   HENT:   Head: Normocephalic.   Eyes: Pupils are equal, round, and reactive to light. Conjunctivae are normal.   Neck: No tracheal deviation present. No thyromegaly present.   No palpable thyroid nodules     Cardiovascular: Normal rate, regular rhythm and normal heart sounds.   No murmur heard.  Pulmonary/Chest: Effort normal and breath sounds normal. No respiratory distress.   Abdominal: Soft. Bowel sounds are normal. There is no abdominal tenderness.   Gravid uterus   Lymphadenopathy:     She has no cervical adenopathy.   Neurological: She is alert and oriented to person, place, and time. No cranial nerve deficit.   Skin: Skin is warm and dry. She is not diaphoretic. No erythema.   Psychiatric: Her behavior is normal.   Vitals reviewed.    LABS/IMAGING: outside records reviewed and summarized in HPI    ASSESSMENT/PLAN:  1) subclinical hyperthyroidism due to Graves disease during pregnancy, second trimester:  - clinically euthyroid on exam today but with some symptoms of intermittent palpitations. Pt states she feels okay overall.      -Discussed with patient that per American thyroid Association guidelines, treatment with thionamides is " not indicated for women with subclinical hyperthyroidism during pregnancy.  -Discussed that if she develops overt hyperthyroidism with moderate to severe symptoms, a thionamide will be prescribed.   -At this time I would like to monitor her thyroid function test closely  Patient has been given lab slips for TSH, free T4, total T4 and Total T3 to be completed on June 8, 2022.  I will discuss these lab results with patient when reviewed.   Please note:  total T4 and T3 concentrations that exceed 1.5 times the upper limit of normal for nonpregnant patients reflect overt hyperthyroidism.   - Will measure Graves antibodies again in the third trimester. The fetus or infant is more likely to have Graves' hyperthyroidism when the maternal value is more than three to five times the upper limit of normal.  -Reviewed the signs and symptoms of overt hyperthyroidism with patient.  Instructed pt to contact me if she develops any of these symptoms    Patient scheduled to return to clinic on 07/05/2022.    Signed: Evelin Mahmood MD

## 2022-06-08 ENCOUNTER — OFFICE VISIT (OUTPATIENT)
Dept: OBSTETRICS AND GYNECOLOGY | Facility: HOSPITAL | Age: 27
End: 2022-06-08

## 2022-06-08 ENCOUNTER — HOSPITAL ENCOUNTER (OUTPATIENT)
Dept: WOMENS IMAGING | Facility: HOSPITAL | Age: 27
Discharge: HOME | End: 2022-06-08

## 2022-06-08 ENCOUNTER — LAB (OUTPATIENT)
Dept: LAB | Facility: HOSPITAL | Age: 27
End: 2022-06-08

## 2022-06-08 VITALS — WEIGHT: 215.8 LBS | BODY MASS INDEX: 33.8 KG/M2 | SYSTOLIC BLOOD PRESSURE: 122 MMHG | DIASTOLIC BLOOD PRESSURE: 68 MMHG

## 2022-06-08 DIAGNOSIS — E05.00 GRAVES DISEASE: ICD-10-CM

## 2022-06-08 DIAGNOSIS — O99.282 HYPERTHYROIDISM AFFECTING PREGNANCY IN SECOND TRIMESTER: ICD-10-CM

## 2022-06-08 DIAGNOSIS — E05.90 HYPERTHYROIDISM AFFECTING PREGNANCY IN SECOND TRIMESTER: ICD-10-CM

## 2022-06-08 DIAGNOSIS — E05.00 GRAVES DISEASE: Primary | ICD-10-CM

## 2022-06-08 PROCEDURE — 76816 OB US FOLLOW-UP PER FETUS: CPT

## 2022-06-08 PROCEDURE — 99215 OFFICE O/P EST HI 40 MIN: CPT | Performed by: OBSTETRICS & GYNECOLOGY

## 2022-06-08 PROCEDURE — 76816 OB US FOLLOW-UP PER FETUS: CPT | Performed by: OBSTETRICS & GYNECOLOGY

## 2022-06-09 LAB
T3 SERPL-MCNC: 195 NG/DL (ref 71–180)
T4 FREE SERPL-MCNC: 1.02 NG/DL (ref 0.93–1.7)
T4 SERPL-MCNC: 12 UG/DL (ref 4.5–12)
TSH SERPL DL<=0.005 MIU/L-ACNC: 0.05 UIU/ML (ref 0.27–4.2)

## 2022-07-05 ENCOUNTER — LAB (OUTPATIENT)
Dept: LAB | Facility: HOSPITAL | Age: 27
End: 2022-07-05

## 2022-07-05 ENCOUNTER — HOSPITAL ENCOUNTER (OUTPATIENT)
Dept: WOMENS IMAGING | Facility: HOSPITAL | Age: 27
Discharge: HOME | End: 2022-07-05

## 2022-07-05 ENCOUNTER — OFFICE VISIT (OUTPATIENT)
Dept: OBSTETRICS AND GYNECOLOGY | Facility: HOSPITAL | Age: 27
End: 2022-07-05

## 2022-07-05 ENCOUNTER — OFFICE VISIT (OUTPATIENT)
Dept: ENDOCRINOLOGY | Facility: CLINIC | Age: 27
End: 2022-07-05

## 2022-07-05 VITALS
BODY MASS INDEX: 34.53 KG/M2 | DIASTOLIC BLOOD PRESSURE: 68 MMHG | HEIGHT: 67 IN | OXYGEN SATURATION: 98 % | SYSTOLIC BLOOD PRESSURE: 120 MMHG | HEART RATE: 91 BPM | WEIGHT: 220 LBS

## 2022-07-05 VITALS — BODY MASS INDEX: 34.3 KG/M2 | DIASTOLIC BLOOD PRESSURE: 75 MMHG | WEIGHT: 219 LBS | SYSTOLIC BLOOD PRESSURE: 114 MMHG

## 2022-07-05 DIAGNOSIS — E05.00 GRAVES DISEASE: Primary | ICD-10-CM

## 2022-07-05 DIAGNOSIS — Z34.90 PREGNANCY, UNSPECIFIED GESTATIONAL AGE: ICD-10-CM

## 2022-07-05 DIAGNOSIS — Z84.89 FAMILY HISTORY OF GENETIC DISEASE: ICD-10-CM

## 2022-07-05 DIAGNOSIS — E05.90 HYPERTHYROIDISM DURING PREGNANCY: Primary | ICD-10-CM

## 2022-07-05 DIAGNOSIS — E05.00 GRAVES DISEASE: ICD-10-CM

## 2022-07-05 DIAGNOSIS — O99.280 HYPERTHYROIDISM DURING PREGNANCY: Primary | ICD-10-CM

## 2022-07-05 PROCEDURE — 76816 OB US FOLLOW-UP PER FETUS: CPT | Performed by: OBSTETRICS & GYNECOLOGY

## 2022-07-05 PROCEDURE — 99213 OFFICE O/P EST LOW 20 MIN: CPT | Performed by: INTERNAL MEDICINE

## 2022-07-05 PROCEDURE — 76816 OB US FOLLOW-UP PER FETUS: CPT

## 2022-07-05 NOTE — PROGRESS NOTES
Chief Complaint   Patient presents with   • Hypothyroidism     Follow up 32 weeks     HPI:   Alexandria Stock is a 26 y.o.female who presents to endocrine clinic for further evaluation of Graves disease during pregnancy. Last visit 05/16/2022. Her history is as follows:    Interim Events:  - pt is currently 31w6d gestation. OB-Gyn Josselin Hutchinson CNM  - Is being followed by [facility] as well. Last Fetal US 07/05/2022 - no abnormalities.  - Reports recent palpitations were thought to be related to low blood pressure. Pt was advised by her Gyn to increase salt intake. Pt is drinking more electrolyte based drinks / eating more salt. BP is better.   - Reports fatigue and occasional palpitations.  - Reports pruritic rash on arms and legs that worsens with sun exposure. Is using Benadryl Cream and Bendryl qhs PRN    1) subclinical hyperthyroidism due to Graves disease during pregnancy:  - pt is currently 31w6d gestation. FIDENCIO 09/01/2022    (02/28/2022) Saw pt at 14 weeks gestation. (Fourth pregnancy)  Labs at OB office: (02/12/2022) TSH < 0.015, free T4 1.58 (0.78 - 2.19). [ pt on prenatal vitamin. No high dose biotin supplement]  -Discussed with patient that per American thyroid Association guidelines, treatment with thionamides is not indicated for women with subclinical hyperthyroidism during pregnancy.  -Discussed that if she develops overt hyperthyroidism with moderate to severe symptoms, a thionamide will be prescribed.   -The plan was to monitor her thyroid function test closely and check for TSH receptor antibodies.   (03/28/2022) TSH Receptor Ab 4.61 (0 - 1.75, TSI 4.61 (0 - 0.55)       Currently with occasional palpitations at rest or with activity. Denies N/V/D or wt loss.      Prior History:  1) (2016 - 2017) h/o hyperthyroidism during pregnancy (gestational transient thyrotoxicosis):   - (04/2016) pt was found to have a slightly decreased TSH in 03/2016 and 04/2016 at her PCP's office. Pt was asymptomatic at  that time.   3/2016: TSH 0.13 (0.40 - 4.50), free T4 1.1 (normal), free T3 3.9 (normal)  4/2016: TSH 0.21 (0.40 - 4.50)  In early June 2016 she was evaluated for her first pregnancy. She reports feeling very ill during the first trimester. She had symptoms of significant nausea, palpitations, increased anxiety and depression, and significant myalgias.  On 6/24/2016 labs were c/w subclinical hyperthyroidism: TSH <0.004, free T4 normal at 1.32, free T3 normal at 4.3  Because of her symptoms, she was prescribed methimazole 5 mg TID at 13 weeks. She reported feeling better after the medication was initiated.  - In 12/2016, initial endocrine visit, checked TSH receptor antibody which was normal. I instructed her to stop the methimazole at that time.   - By 03/2017, approx 6 weeks postpartum, TFT's were normal  - In 08/2017, pt had post-partum thyroiditis. TFTs returned to normal a few weeks later per patient.    2) (07/2020 - 09/2020)  h/o intermittent hyperthyroidism: (not pregnant)  -She presented to her PCP in 07/2020 with complaints of mood swings, heat intolerance, palpitations, increased anxiety, and weight gain the 5 to 6 months prior.  Outside Labs: (07/20/2020) - pt not on Biotin or estrogen when labs collected  TSH 0.01, Free T4 1.90 (0.80 - 1.90) ,Free T3 6.9 (2.2 - 4.2), TPO Ab 145, TG Ab 118  TSI% or TSH receptor antibody was not checked at that time  - PCP prescribed metoprolol 25 mg daily and methimazole 5 mg TID in July 2020. I saw pt in 09/2020. Briefly looked at her thyroid gland with in clinic ultrasound which showed a heterogeneous slightly hypoechoic gland with increased vascularity.  No dominant nodule was visualized. Labs in 0/92020 showed a negative TSH Receptor Ab after 2 months of methimazole treatment. Pt stopped her methimazole and metoprolol at some point on her own. Did not follow-up in 01/2021. Pt reported feeling okay after stopping these medications     FMHx: paternal grandmother had  "hyperthyroidism, paternal aunt hypothyroidism.    Review of Systems   Constitutional: Positive for fatigue. Negative for diaphoresis.        Expected wt increase   HENT: Negative.    Eyes: Negative.    Respiratory: Negative.    Cardiovascular: Positive for palpitations (intermittent).   Gastrointestinal: Negative for abdominal pain, constipation, diarrhea, nausea and vomiting.   Endocrine: Negative for heat intolerance.   Genitourinary: Negative.    Musculoskeletal: Negative.  Negative for myalgias.   Skin: Positive for rash (see HPI).   Allergic/Immunologic: Negative.    Neurological: Negative.    Hematological: Negative.    Psychiatric/Behavioral:        H/o anxiety       The following portions of the patient's history were reviewed and updated as appropriate: allergies, current medications, past family history, past medical history, past social history, past surgical history and problem list.    /68   Pulse 91   Ht 170.2 cm (67\")   Wt 99.8 kg (220 lb)   LMP 11/25/2021   SpO2 98%   BMI 34.46 kg/m²   Physical Exam   Constitutional: She is oriented to person, place, and time. She appears well-developed. No distress.   HENT:   Head: Normocephalic.   Eyes: Pupils are equal, round, and reactive to light. Conjunctivae are normal.   Neck: No tracheal deviation present. No thyromegaly present.   No palpable thyroid nodules     Cardiovascular: Normal rate, regular rhythm and normal heart sounds.   No murmur heard.  Pulmonary/Chest: Effort normal and breath sounds normal. No respiratory distress.   Abdominal: Soft. Bowel sounds are normal. There is no abdominal tenderness.   Gravid uterus   Lymphadenopathy:     She has no cervical adenopathy.   Neurological: She is alert and oriented to person, place, and time. No cranial nerve deficit.   Skin: Skin is warm and dry. Rash (small, blanching erythematous palpues on thighs and arms) noted. She is not diaphoretic. No erythema.   Psychiatric: Her behavior is normal. "   Vitals reviewed.    LABS/IMAGING: outside records reviewed and summarized in HPI  Office Visit on 07/05/2022   Component Date Value Ref Range Status   • T3, Total 07/05/2022 182 (A) 71 - 180 ng/dL Final   • Free T4 07/05/2022 0.96  0.93 - 1.70 ng/dL Final    Results may be falsely increased if patient taking Biotin.   • TSH 07/05/2022 0.130 (A) 0.270 - 4.200 uIU/mL Final   • T4, Total 07/05/2022 11.6  4.5 - 12.0 ug/dL Final       ASSESSMENT/PLAN:  1) subclinical hyperthyroidism due to Graves disease during pregnancy, second trimester:  - clinically euthyroid on exam today. Pt states she feels okay overall. Main symptoms are fatigue and occasional palpitations.  - TFT's checked today are stable. TSH is better at 0.130, free T4 and Total T4 are normal. Please note:  total T4 and T3 concentrations that exceed 1.5 times the upper limit of normal for nonpregnant patients reflect overt hyperthyroidism.     - Graves antibodies labs are ordered for 07/20/2022.     - Reviewed the signs and symptoms of overt hyperthyroidism with patient.  Instructed pt to contact me if she develops any of these symptoms     - Have discussed with patient at prior visits that per American thyroid Association guidelines, treatment with thionamides is not indicated for women with subclinical hyperthyroidism during pregnancy.  -Discussed that if she develops overt hyperthyroidism with moderate to severe symptoms, a thionamide will be prescribed.     Will check her TFT's again approximately 6 weeks post-partum     Pt scheduled to RTC on 10/19/2022    Signed: Evelin Mahmood MD

## 2022-07-06 ENCOUNTER — APPOINTMENT (OUTPATIENT)
Dept: WOMENS IMAGING | Facility: HOSPITAL | Age: 27
End: 2022-07-06

## 2022-07-06 LAB
T3 SERPL-MCNC: 182 NG/DL (ref 71–180)
T4 FREE SERPL-MCNC: 0.96 NG/DL (ref 0.93–1.7)
T4 SERPL-MCNC: 11.6 UG/DL (ref 4.5–12)
TSH SERPL DL<=0.005 MIU/L-ACNC: 0.13 UIU/ML (ref 0.27–4.2)

## 2022-07-20 ENCOUNTER — LAB (OUTPATIENT)
Dept: LAB | Facility: HOSPITAL | Age: 27
End: 2022-07-20

## 2022-07-20 DIAGNOSIS — E05.00 GRAVES DISEASE: ICD-10-CM

## 2022-07-20 DIAGNOSIS — E05.90 HYPERTHYROIDISM DURING PREGNANCY: ICD-10-CM

## 2022-07-20 DIAGNOSIS — O99.280 HYPERTHYROIDISM DURING PREGNANCY: ICD-10-CM

## 2022-07-22 LAB
SPECIMEN STATUS: NORMAL
TSH RECEP AB SER-ACNC: NORMAL IU/L
TSI SER-ACNC: 1.93 IU/L (ref 0–0.55)

## 2022-09-03 ENCOUNTER — HOSPITAL ENCOUNTER (INPATIENT)
Facility: HOSPITAL | Age: 27
LOS: 3 days | Discharge: HOME | End: 2022-09-06
Attending: NURSE PRACTITIONER | Admitting: ADVANCED PRACTICE MIDWIFE

## 2022-09-03 ENCOUNTER — HOSPITAL ENCOUNTER (OUTPATIENT)
Dept: LABOR AND DELIVERY | Facility: HOSPITAL | Age: 27
Discharge: HOME | End: 2022-09-03

## 2022-09-03 PROBLEM — Z3A.40 40 WEEKS GESTATION OF PREGNANCY: Status: ACTIVE | Noted: 2022-09-03

## 2022-09-03 LAB
ABO GROUP BLD: NORMAL
ALP SERPL-CCNC: 171 U/L (ref 39–117)
ALT SERPL W P-5'-P-CCNC: 16 U/L (ref 1–33)
AST SERPL-CCNC: 22 U/L (ref 1–32)
BILIRUB SERPL-MCNC: <0.2 MG/DL (ref 0–1.2)
BLD GP AB SCN SERPL QL: NEGATIVE
CREAT SERPL-MCNC: 0.64 MG/DL (ref 0.57–1)
DEPRECATED RDW RBC AUTO: 39.1 FL (ref 37–54)
ERYTHROCYTE [DISTWIDTH] IN BLOOD BY AUTOMATED COUNT: 13.5 % (ref 12.3–15.4)
HCT VFR BLD AUTO: 27.8 % (ref 34–46.6)
HGB BLD-MCNC: 9.4 G/DL (ref 12–15.9)
LDH SERPL-CCNC: 202 U/L (ref 135–214)
MCH RBC QN AUTO: 27.3 PG (ref 26.6–33)
MCHC RBC AUTO-ENTMCNC: 33.8 G/DL (ref 31.5–35.7)
MCV RBC AUTO: 80.8 FL (ref 79–97)
PLATELET # BLD AUTO: 173 10*3/MM3 (ref 140–450)
PMV BLD AUTO: 11.2 FL (ref 6–12)
RBC # BLD AUTO: 3.44 10*6/MM3 (ref 3.77–5.28)
RH BLD: POSITIVE
T&S EXPIRATION DATE: NORMAL
URATE SERPL-MCNC: 3.4 MG/DL (ref 2.4–5.7)
WBC NRBC COR # BLD: 6.99 10*3/MM3 (ref 3.4–10.8)

## 2022-09-03 PROCEDURE — 82247 BILIRUBIN TOTAL: CPT | Performed by: OBSTETRICS & GYNECOLOGY

## 2022-09-03 PROCEDURE — 82565 ASSAY OF CREATININE: CPT | Performed by: OBSTETRICS & GYNECOLOGY

## 2022-09-03 PROCEDURE — 84075 ASSAY ALKALINE PHOSPHATASE: CPT | Performed by: OBSTETRICS & GYNECOLOGY

## 2022-09-03 PROCEDURE — 85027 COMPLETE CBC AUTOMATED: CPT | Performed by: OBSTETRICS & GYNECOLOGY

## 2022-09-03 PROCEDURE — 86901 BLOOD TYPING SEROLOGIC RH(D): CPT | Performed by: OBSTETRICS & GYNECOLOGY

## 2022-09-03 PROCEDURE — 84450 TRANSFERASE (AST) (SGOT): CPT | Performed by: OBSTETRICS & GYNECOLOGY

## 2022-09-03 PROCEDURE — 86850 RBC ANTIBODY SCREEN: CPT | Performed by: OBSTETRICS & GYNECOLOGY

## 2022-09-03 PROCEDURE — 83615 LACTATE (LD) (LDH) ENZYME: CPT | Performed by: OBSTETRICS & GYNECOLOGY

## 2022-09-03 PROCEDURE — 86900 BLOOD TYPING SEROLOGIC ABO: CPT | Performed by: OBSTETRICS & GYNECOLOGY

## 2022-09-03 PROCEDURE — 84550 ASSAY OF BLOOD/URIC ACID: CPT | Performed by: OBSTETRICS & GYNECOLOGY

## 2022-09-03 PROCEDURE — 36415 COLL VENOUS BLD VENIPUNCTURE: CPT | Performed by: OBSTETRICS & GYNECOLOGY

## 2022-09-03 PROCEDURE — 84460 ALANINE AMINO (ALT) (SGPT): CPT | Performed by: OBSTETRICS & GYNECOLOGY

## 2022-09-03 RX ORDER — SODIUM CHLORIDE 0.9 % (FLUSH) 0.9 %
3 SYRINGE (ML) INJECTION EVERY 12 HOURS SCHEDULED
Status: DISCONTINUED | OUTPATIENT
Start: 2022-09-03 | End: 2022-09-04 | Stop reason: HOSPADM

## 2022-09-03 RX ORDER — BUTORPHANOL TARTRATE 1 MG/ML
1 INJECTION, SOLUTION INTRAMUSCULAR; INTRAVENOUS
Status: DISCONTINUED | OUTPATIENT
Start: 2022-09-03 | End: 2022-09-04 | Stop reason: HOSPADM

## 2022-09-03 RX ORDER — OXYTOCIN/0.9 % SODIUM CHLORIDE 30/500 ML
2-20 PLASTIC BAG, INJECTION (ML) INTRAVENOUS
Status: DISCONTINUED | OUTPATIENT
Start: 2022-09-03 | End: 2022-09-04 | Stop reason: HOSPADM

## 2022-09-03 RX ORDER — LIDOCAINE HYDROCHLORIDE 10 MG/ML
5 INJECTION, SOLUTION EPIDURAL; INFILTRATION; INTRACAUDAL; PERINEURAL AS NEEDED
Status: DISCONTINUED | OUTPATIENT
Start: 2022-09-03 | End: 2022-09-04 | Stop reason: HOSPADM

## 2022-09-03 RX ORDER — MAGNESIUM CARB/ALUMINUM HYDROX 105-160MG
30 TABLET,CHEWABLE ORAL ONCE
Status: DISCONTINUED | OUTPATIENT
Start: 2022-09-03 | End: 2022-09-04 | Stop reason: HOSPADM

## 2022-09-03 RX ORDER — ONDANSETRON 2 MG/ML
4 INJECTION INTRAMUSCULAR; INTRAVENOUS EVERY 6 HOURS PRN
Status: DISCONTINUED | OUTPATIENT
Start: 2022-09-03 | End: 2022-09-04 | Stop reason: HOSPADM

## 2022-09-03 RX ORDER — ONDANSETRON 4 MG/1
4 TABLET, FILM COATED ORAL EVERY 6 HOURS PRN
Status: DISCONTINUED | OUTPATIENT
Start: 2022-09-03 | End: 2022-09-04 | Stop reason: HOSPADM

## 2022-09-03 RX ORDER — SODIUM CHLORIDE, SODIUM LACTATE, POTASSIUM CHLORIDE, CALCIUM CHLORIDE 600; 310; 30; 20 MG/100ML; MG/100ML; MG/100ML; MG/100ML
125 INJECTION, SOLUTION INTRAVENOUS CONTINUOUS
Status: DISCONTINUED | OUTPATIENT
Start: 2022-09-03 | End: 2022-09-04

## 2022-09-03 RX ORDER — SODIUM CHLORIDE 0.9 % (FLUSH) 0.9 %
10 SYRINGE (ML) INJECTION AS NEEDED
Status: DISCONTINUED | OUTPATIENT
Start: 2022-09-03 | End: 2022-09-04 | Stop reason: HOSPADM

## 2022-09-03 RX ADMIN — SODIUM CHLORIDE, POTASSIUM CHLORIDE, SODIUM LACTATE AND CALCIUM CHLORIDE 125 ML/HR: 600; 310; 30; 20 INJECTION, SOLUTION INTRAVENOUS at 23:02

## 2022-09-04 ENCOUNTER — ANESTHESIA (OUTPATIENT)
Dept: LABOR AND DELIVERY | Facility: HOSPITAL | Age: 27
End: 2022-09-04

## 2022-09-04 ENCOUNTER — ANESTHESIA EVENT (OUTPATIENT)
Dept: LABOR AND DELIVERY | Facility: HOSPITAL | Age: 27
End: 2022-09-04

## 2022-09-04 PROBLEM — Z34.90 PREGNANCY: Status: RESOLVED | Noted: 2022-07-05 | Resolved: 2022-09-04

## 2022-09-04 PROBLEM — Z3A.40 40 WEEKS GESTATION OF PREGNANCY: Status: RESOLVED | Noted: 2022-09-03 | Resolved: 2022-09-04

## 2022-09-04 PROCEDURE — 25010000002 FENTANYL CITRATE (PF) 50 MCG/ML SOLUTION: Performed by: ANESTHESIOLOGY

## 2022-09-04 PROCEDURE — 25010000002 OXYTOCIN PER 10 UNITS: Performed by: OBSTETRICS & GYNECOLOGY

## 2022-09-04 PROCEDURE — 25010000002 ROPIVACAINE PER 1 MG: Performed by: ANESTHESIOLOGY

## 2022-09-04 PROCEDURE — C1755 CATHETER, INTRASPINAL: HCPCS

## 2022-09-04 PROCEDURE — C1755 CATHETER, INTRASPINAL: HCPCS | Performed by: ANESTHESIOLOGY

## 2022-09-04 PROCEDURE — 59025 FETAL NON-STRESS TEST: CPT

## 2022-09-04 RX ORDER — PRENATAL VIT/IRON FUM/FOLIC AC 27MG-0.8MG
1 TABLET ORAL DAILY
Status: DISCONTINUED | OUTPATIENT
Start: 2022-09-04 | End: 2022-09-06 | Stop reason: HOSPADM

## 2022-09-04 RX ORDER — ROPIVACAINE HYDROCHLORIDE 2 MG/ML
15 INJECTION, SOLUTION EPIDURAL; INFILTRATION; PERINEURAL CONTINUOUS
Status: DISCONTINUED | OUTPATIENT
Start: 2022-09-04 | End: 2022-09-04

## 2022-09-04 RX ORDER — FERROUS SULFATE 325(65) MG
325 TABLET ORAL 2 TIMES DAILY WITH MEALS
Status: DISCONTINUED | OUTPATIENT
Start: 2022-09-04 | End: 2022-09-06 | Stop reason: HOSPADM

## 2022-09-04 RX ORDER — DIPHENHYDRAMINE HYDROCHLORIDE 50 MG/ML
12.5 INJECTION INTRAMUSCULAR; INTRAVENOUS EVERY 8 HOURS PRN
Status: DISCONTINUED | OUTPATIENT
Start: 2022-09-04 | End: 2022-09-04 | Stop reason: HOSPADM

## 2022-09-04 RX ORDER — FENTANYL CITRATE 50 UG/ML
INJECTION, SOLUTION INTRAMUSCULAR; INTRAVENOUS
Status: COMPLETED
Start: 2022-09-04 | End: 2022-09-04

## 2022-09-04 RX ORDER — CARBOPROST TROMETHAMINE 250 UG/ML
250 INJECTION, SOLUTION INTRAMUSCULAR AS NEEDED
Status: DISCONTINUED | OUTPATIENT
Start: 2022-09-04 | End: 2022-09-04 | Stop reason: HOSPADM

## 2022-09-04 RX ORDER — FAMOTIDINE 10 MG/ML
20 INJECTION, SOLUTION INTRAVENOUS ONCE AS NEEDED
Status: DISCONTINUED | OUTPATIENT
Start: 2022-09-04 | End: 2022-09-04 | Stop reason: HOSPADM

## 2022-09-04 RX ORDER — OXYTOCIN/0.9 % SODIUM CHLORIDE 30/500 ML
650 PLASTIC BAG, INJECTION (ML) INTRAVENOUS ONCE
Status: DISCONTINUED | OUTPATIENT
Start: 2022-09-04 | End: 2022-09-04 | Stop reason: HOSPADM

## 2022-09-04 RX ORDER — DOCUSATE SODIUM 100 MG/1
100 CAPSULE, LIQUID FILLED ORAL 2 TIMES DAILY
Status: DISCONTINUED | OUTPATIENT
Start: 2022-09-04 | End: 2022-09-06 | Stop reason: HOSPADM

## 2022-09-04 RX ORDER — METOCLOPRAMIDE HYDROCHLORIDE 5 MG/ML
10 INJECTION INTRAMUSCULAR; INTRAVENOUS ONCE AS NEEDED
Status: DISCONTINUED | OUTPATIENT
Start: 2022-09-04 | End: 2022-09-04 | Stop reason: HOSPADM

## 2022-09-04 RX ORDER — LIDOCAINE HYDROCHLORIDE AND EPINEPHRINE 15; 5 MG/ML; UG/ML
INJECTION, SOLUTION EPIDURAL AS NEEDED
Status: DISCONTINUED | OUTPATIENT
Start: 2022-09-04 | End: 2022-09-04 | Stop reason: SURG

## 2022-09-04 RX ORDER — FENTANYL CITRATE 50 UG/ML
INJECTION, SOLUTION INTRAMUSCULAR; INTRAVENOUS AS NEEDED
Status: DISCONTINUED | OUTPATIENT
Start: 2022-09-04 | End: 2022-09-04 | Stop reason: SURG

## 2022-09-04 RX ORDER — OXYTOCIN/0.9 % SODIUM CHLORIDE 30/500 ML
85 PLASTIC BAG, INJECTION (ML) INTRAVENOUS ONCE
Status: COMPLETED | OUTPATIENT
Start: 2022-09-04 | End: 2022-09-04

## 2022-09-04 RX ORDER — ROPIVACAINE HYDROCHLORIDE 5 MG/ML
INJECTION, SOLUTION EPIDURAL; INFILTRATION; PERINEURAL AS NEEDED
Status: DISCONTINUED | OUTPATIENT
Start: 2022-09-04 | End: 2022-09-04 | Stop reason: SURG

## 2022-09-04 RX ORDER — ONDANSETRON 2 MG/ML
4 INJECTION INTRAMUSCULAR; INTRAVENOUS ONCE AS NEEDED
Status: DISCONTINUED | OUTPATIENT
Start: 2022-09-04 | End: 2022-09-04 | Stop reason: HOSPADM

## 2022-09-04 RX ORDER — TRISODIUM CITRATE DIHYDRATE AND CITRIC ACID MONOHYDRATE 500; 334 MG/5ML; MG/5ML
30 SOLUTION ORAL ONCE
Status: DISCONTINUED | OUTPATIENT
Start: 2022-09-04 | End: 2022-09-04 | Stop reason: HOSPADM

## 2022-09-04 RX ORDER — EPHEDRINE SULFATE 5 MG/ML
10 INJECTION INTRAVENOUS
Status: DISCONTINUED | OUTPATIENT
Start: 2022-09-04 | End: 2022-09-04 | Stop reason: HOSPADM

## 2022-09-04 RX ORDER — METHYLERGONOVINE MALEATE 0.2 MG/ML
200 INJECTION INTRAVENOUS ONCE AS NEEDED
Status: DISCONTINUED | OUTPATIENT
Start: 2022-09-04 | End: 2022-09-04 | Stop reason: HOSPADM

## 2022-09-04 RX ORDER — ACETAMINOPHEN 325 MG/1
650 TABLET ORAL EVERY 4 HOURS PRN
Status: DISCONTINUED | OUTPATIENT
Start: 2022-09-04 | End: 2022-09-06 | Stop reason: HOSPADM

## 2022-09-04 RX ORDER — MISOPROSTOL 200 UG/1
800 TABLET ORAL AS NEEDED
Status: DISCONTINUED | OUTPATIENT
Start: 2022-09-04 | End: 2022-09-04 | Stop reason: HOSPADM

## 2022-09-04 RX ORDER — IBUPROFEN 600 MG/1
600 TABLET ORAL EVERY 6 HOURS PRN
Status: DISCONTINUED | OUTPATIENT
Start: 2022-09-04 | End: 2022-09-06 | Stop reason: HOSPADM

## 2022-09-04 RX ADMIN — LIDOCAINE HYDROCHLORIDE AND EPINEPHRINE 2 ML: 15; 5 INJECTION, SOLUTION EPIDURAL at 11:24

## 2022-09-04 RX ADMIN — Medication: at 17:17

## 2022-09-04 RX ADMIN — IBUPROFEN 600 MG: 600 TABLET ORAL at 21:27

## 2022-09-04 RX ADMIN — SODIUM CHLORIDE, POTASSIUM CHLORIDE, SODIUM LACTATE AND CALCIUM CHLORIDE 1000 ML: 600; 310; 30; 20 INJECTION, SOLUTION INTRAVENOUS at 11:24

## 2022-09-04 RX ADMIN — ROPIVACAINE HYDROCHLORIDE 15 ML/HR: 2 INJECTION, SOLUTION EPIDURAL; INFILTRATION at 11:30

## 2022-09-04 RX ADMIN — LIDOCAINE HYDROCHLORIDE AND EPINEPHRINE 3 ML: 15; 5 INJECTION, SOLUTION EPIDURAL at 11:22

## 2022-09-04 RX ADMIN — FENTANYL CITRATE 100 MCG: 50 INJECTION, SOLUTION INTRAMUSCULAR; INTRAVENOUS at 11:28

## 2022-09-04 RX ADMIN — IBUPROFEN 600 MG: 600 TABLET ORAL at 15:36

## 2022-09-04 RX ADMIN — LIDOCAINE HYDROCHLORIDE AND EPINEPHRINE 3 ML: 15; 5 INJECTION, SOLUTION EPIDURAL at 12:15

## 2022-09-04 RX ADMIN — DOCUSATE SODIUM 100 MG: 100 CAPSULE, LIQUID FILLED ORAL at 20:05

## 2022-09-04 RX ADMIN — OXYTOCIN 85 ML/HR: 10 INJECTION INTRAVENOUS at 14:40

## 2022-09-04 RX ADMIN — ROPIVACAINE HYDROCHLORIDE 6 ML: 5 INJECTION, SOLUTION EPIDURAL; INFILTRATION; PERINEURAL at 12:20

## 2022-09-04 RX ADMIN — LIDOCAINE HYDROCHLORIDE AND EPINEPHRINE 3 ML: 15; 5 INJECTION, SOLUTION EPIDURAL at 11:12

## 2022-09-04 RX ADMIN — WITCH HAZEL 1 PAD: 500 SOLUTION RECTAL; TOPICAL at 17:17

## 2022-09-04 RX ADMIN — ROPIVACAINE HYDROCHLORIDE 7 ML: 5 INJECTION, SOLUTION EPIDURAL; INFILTRATION; PERINEURAL at 11:26

## 2022-09-04 RX ADMIN — SODIUM CHLORIDE, POTASSIUM CHLORIDE, SODIUM LACTATE AND CALCIUM CHLORIDE 1000 ML: 600; 310; 30; 20 INJECTION, SOLUTION INTRAVENOUS at 10:30

## 2022-09-04 RX ADMIN — LIDOCAINE HYDROCHLORIDE AND EPINEPHRINE 2 ML: 15; 5 INJECTION, SOLUTION EPIDURAL at 12:17

## 2022-09-04 NOTE — LACTATION NOTE
09/04/22 0315   Maternal Information   Person Making Referral lactation consultant  (courtesy consult)   Maternal Reason for Referral   ( 1st and 2nd babies for ~1 month;  3rd baby for 13 months)   Infant Reason for Referral   (reports baby  well in labor and delivery)   Milk Expression/Equipment   Breast Pump Type double electric, personal  (Mom has personal pump at home)   Teaching done as documented under Education. To call lactation services, if there are questions or concerns or if mom wants help with a breastfeeding.

## 2022-09-04 NOTE — PLAN OF CARE
Problem: Breastfeeding  Goal: Effective Breastfeeding  Outcome: Ongoing, Progressing  Intervention: Support Exclusive Breastfeeding Success  Flowsheets (Taken 9/4/2022 1645)  Supportive Measures: active listening utilized  Breastfeeding Support:   diary/feeding log utilized   encouragement provided   lactation counseling provided  Intervention: Promote Effective Breastfeeding  Flowsheets (Taken 9/4/2022 1645)  Breastfeeding Assistance:   feeding cue recognition promoted   feeding on demand promoted   support offered  Parent/Child Attachment Promotion:   caring behavior modeled   participation in care promoted   positive reinforcement provided   strengths emphasized   Goal Outcome Evaluation:

## 2022-09-04 NOTE — ANESTHESIA PREPROCEDURE EVALUATION
Anesthesia Evaluation     Patient summary reviewed and Nursing notes reviewed   NPO Solid Status: > 6 hours             Airway   Mallampati: II  TM distance: >3 FB  Neck ROM: full  No difficulty expected  Dental      Pulmonary    (+) asthma,  Cardiovascular - negative cardio ROS        Neuro/Psych  (+) headaches, psychiatric history Anxiety and Depression,    GI/Hepatic/Renal/Endo    (+)   thyroid problem (Graves) hyperthyroidism    Musculoskeletal (-) negative ROS    Abdominal    Substance History - negative use     OB/GYN    (+) Pregnant,         Other - negative ROS                     Anesthesia Plan    ASA 3     epidural       Anesthetic plan, risks, benefits, and alternatives have been provided, discussed and informed consent has been obtained with: patient.        CODE STATUS:    Level Of Support Discussed With: Patient  Code Status (Patient has no pulse and is not breathing): CPR (Attempt to Resuscitate)  Medical Interventions (Patient has pulse or is breathing): Full Support  Release to patient: Routine Release

## 2022-09-04 NOTE — H&P
[place]  Obstetric History and Physical    Chief Complaint   Patient presents with   • Scheduled Induction       Subjective     Patient is a 26 y.o. female  currently at 40w3d, who presents with regular contractions last evening. She has now had an epidural which is not effective yet.  Progressing rapidly from 2 cm to 7 cm while sitting up for epidural placement.    Her prenatal care is benign.  Her previous obstetric/gynecological history is noted for 3 previous term 's.  The following portions of the patients history were reviewed and updated as appropriate: current medications, allergies, past medical history, past surgical history, past family history, past social history and problem list .       Prenatal Information:     Group B Strep Culture No results found for: GBSANTIGEN           External Prenatal Results     Pregnancy Outside Results - Transcribed From Office Records - See Scanned Records For Details     Test Value Date Time    ABO  O  22    Rh  Positive  22    Antibody Screen  Negative  22    Varicella IgG       Rubella ^ Immune  16     Hgb  9.4 g/dL 22       11.4 g/dL 22 0944    Hct  27.8 % 22       34.8 % 22 0944    Glucose Fasting GTT       Glucose Tolerance Test 1 hour       Glucose Tolerance Test 3 hour       Gonorrhea (discrete)  Negative  21 1609    Chlamydia (discrete)  Negative  21 1609    RPR  Non-Reactive  16 1414    VDRL       Syphilis Antibody       HBsAg  NonReactive  16 1039    Herpes Simplex Virus PCR       Herpes Simplex VIrus Culture       HIV       Hep C RNA Quant PCR       Hep C Antibody       AFP       Group B Strep ^ Negative  17     GBS Susceptibility to Clindamycin       GBS Susceptibility to Erythromycin       Fetal Fibronectin       Genetic Testing, Maternal Blood             Drug Screening     Test Value Date Time    Urine Drug Screen ^ negative  16      Amphetamine Screen  Negative ng/mL 16 1039    Barbiturate Screen  Negative ng/mL 16 1039    Benzodiazepine Screen  Negative ng/mL 16 1039    Methadone Screen  Negative ng/mL 16 1039    Phencyclidine Screen  Negative ng/mL 16 1039    Opiates Screen ^ Negative  16       ^ Negative  16     THC Screen ^ Negative  16       ^ Negative  16     Cocaine Screen ^ Negative  16       ^ Negative  16     Propoxyphene Screen  Negative ng/mL 16 1039    Buprenorphine Screen  Negative ng/mL 16 1039    Methamphetamine Screen ^ Negative  16       ^ Negative  16     Oxycodone Screen  Negative ng/mL 16 1039    Tricyclic Antidepressants Screen ^ Negative  16       ^ Negative  16           Legend    ^: Historical                          Past OB History:       OB History    Para Term  AB Living   4 3 3 0 0 3   SAB IAB Ectopic Molar Multiple Live Births   0 0 0 0 0 3      # Outcome Date GA Lbr Eric/2nd Weight Sex Delivery Anes PTL Lv   4 Current            3 Term 17 39w1d  3385 g (7 lb 7.4 oz) M Vag-Spont EPI N BRIAN      Name: AGUSTINA DELACRUZ      Apgar1: 8  Apgar5: 9   2 Term 04/10/14 39w0d  3799 g (8 lb 6 oz) M Vag-Spont   BRIAN      Birth Comments: infant had heart murmur      Name: Trae   1 Term 08/27/10 38w0d  2778 g (6 lb 2 oz) F Vag-Spont   BRIAN      Complications: Gestational hypertension      Name: Jigar      Obstetric Comments   FOB#1-G1   FOB#2-G2,3,4       Past Medical History: Past Medical History:   Diagnosis Date   • Anxiety    • Asthma     ALLERGY RELATED    • Depression    • Depression affecting pregnancy in second trimester, antepartum 2016    Counseling/Psych info given Symptoms resolved once hyperthyroidism treated   • Gestational hypertension     G1   • Graves disease 2021    Been dealing with thyroid issues on and off since    • Graves disease 2022   • Migraine    • Normal  delivery 01/27/2017   • Ovarian cyst    • Postpartum care following vaginal delivery 01/27/2017   • Subclinical hyperthyroidism 2016    on medication till 12/2016   • Urinary tract infection       Past Surgical History Past Surgical History:   Procedure Laterality Date   • KNEE SURGERY  10/2018    right   • VAGINAL DELIVERY      x3   • WISDOM TOOTH EXTRACTION        Family History: Family History   Problem Relation Age of Onset   • Diabetes Brother         Type 1   • Diabetes Maternal Grandfather    • Hyperlipidemia Father    • Thyroid disease Paternal Aunt         Hoshimoto Disease   • Thyroid disease Paternal Grandmother         Graves Disease   • Lymphoma Paternal Grandmother    • Breast cancer Maternal Aunt    • Breast cancer Other       Social History:  reports that she has never smoked. She has never used smokeless tobacco.   reports no history of alcohol use.   reports no history of drug use.        General ROS: Pertinent items are noted in HPI, all other systems reviewed and negative    Objective     Vitals:    09/04/22 0728   BP: 127/85   Pulse: 73   Resp: 16   Temp: 97.7 °F (36.5 °C)     Weight: Weight:  [101 kg (223 lb)] 101 kg (223 lb)     Physical Examination:   General Appearance: alert, well appearing, in intermittent distress secondary to contractions  Lungs: clear to auscultation, no wheezes, rales or rhonchi, symmetric air entry  Heart: regular rate and rhythm, no murmurs  Abdomen: Gravid uterus, + FM, Soft, non tender between contractions.  Back exam: no CVA tenderness   Extremities: no redness or tenderness in the calves or thighs, edema 1+  Skin: normal coloration and turgor, no rashes      Presentation: Vertex   Cervix: Exam by: Method: sterile exam per CNM (LUZMA Huggins)   Dilation: Cervical Dilation (cm): 7-8   Effacement: Cervical Effacement: 90%   Station: Fetal Station: -1        Fetal Heart Rate Assessment   Method: Fetal HR Assessment Method: external, Telemetry/Wireless Fetal HR  Monitor (poor tracing)   Beats/min: Fetal HR (beats/min): 130   Baseline: Fetal HR Baseline: normal range   Varibility: Fetal HR Variability: moderate (amplitude range 6 to 25 bpm)   Accels: Fetal HR Accelerations: greater than/equal to 15 bpm, lasting at least 15 seconds   Decels: Fetal HR Decelerations: other (see comments) (poor tracing)   Tracing Category:       Uterine Assessment   Method: Method: external tocotransducer, Telemetry/Wireless Uterine Activity Monitor (poor tracing)   Frequency (min): Contraction Frequency (Minutes): 2   Ctx Count in 10 min:     Duration:     Intensity: Contraction Intensity: no contractions   Intensity by IUPC:     Resting Tone:     Resting Tone by IUPC:     [place] Units:       Laboratory Results: Labs reviewed        40 weeks gestation of pregnancy        Assessment:  1.  Intrauterine pregnancy at 40w3d weeks gestation with reactive, variable decelerations present fetal status.    2.  labor  without ROM  3.  GBS status:negative  Plan:  1. fetal and uterine monitoring  continuously, expectant management and analgesia with  epidural  2. Plan of care has been reviewed with patient and partner  3.  Risks, benefits of treatment plan have been discussed.  4.  All questions have been answered.        Sarah Huggins CNM  9/4/2022  11:54 EDT

## 2022-09-04 NOTE — L&D DELIVERY NOTE
[facility]   Vaginal Delivery Note    Patient Name: Alexandria Stock  : 1995  MRN: [id number]    Date of Delivery: 2022     Diagnosis     Pre & Post-Delivery:  Intrauterine pregnancy at 40w3d  Labor status: Active spontaneous labor     Normal spontaneous vaginal delivery             Problem List    Transfer to Postpartum     Review the Delivery Report for details.     Delivery     Delivery: Vaginal, Spontaneous     YOB: 2024    Time of Birth:  Gestational Age 12:31 PM   40w3d     Anesthesia: Epidural     Delivering clinician: Sarah Huggins  CNWAYNE   Forceps?   No   Vacuum? No    Shoulder dystocia present: No        Delivery narrative:  Alexandria pushed well to a spontaneous vaginal delivery of an viable female infant in PATTIE position over intact perineum.  Mouth and nose bulb suctioned after delivery of head. Loose cord around shoulder, slipped over body. Shoulders and body delivered easily atraumatically. Vigorous infant placed on mothers abdomen and dried.  Cord was allowed to cease pulsation, double clamped, cut by FOB. Cord blood and cord segment obtained.  Spontaneous delivery of intact placenta with 3 vessel cord. Fundus firm, lochia light rubra.  Perineal and vaginal inspection revealed no laceration.  Mother and daughter stable in the immediate PP period.       Infant     Findings: female  infant     Infant observations: Weight: 7 # 13.4  Length:  20.5 in  Observations/Comments:        Apgars: 8  @ 1 minute /    9  @ 5 minutes   Infant Name: Jubilee     Placenta & Cord         Placenta delivered  Spontaneous  at   2022 12:36 PM     Cord: 3 vessels  present.   Nuchal Cord?  yes; Number of nuchal loops present:  1    Cord blood obtained: Yes    Cord gases obtained:  No      Repair     Episiotomy: None     No    Lacerations: No   Estimated Blood Loss:       Quantitative Blood Loss:  50 mls        Complications     none    Disposition     Mother to Mother Baby/Postpartum  in stable  condition currently.  Baby to remains with mom  in stable condition currently.    Sarah Huggins CNM  09/04/22  13:04 EDT

## 2022-09-04 NOTE — ANESTHESIA PROCEDURE NOTES
Labor Epidural      Patient reassessed immediately prior to procedure    Patient location during procedure: OB  Performed By  Anesthesiologist: Loan Brunson DO  Preanesthetic Checklist  Completed: patient identified, IV checked, risks and benefits discussed, surgical consent, monitors and equipment checked, pre-op evaluation and timeout performed  Additional Notes  CSE performed using 25g Rod  Prep:  Pt Position:sitting  Sterile Tech:cap, gloves, mask and sterile barrier  Prep:chlorhexidine gluconate and isopropyl alcohol  Monitoring:blood pressure monitoring  Epidural Block Procedure:  Approach:midline  Guidance:palpation technique  Location:L3-L4  Needle Type:Tuohy  Needle Gauge:17 G  Loss of Resistance Medium: air  Loss of Resistance: 6cm  Cath Depth at skin:13 cm  Paresthesia: none  Aspiration:negative  Test Dose:negative  Number of Attempts: 2  Post Assessment:  Dressing:occlusive dressing applied and secured with tape  Pt Tolerance:patient tolerated the procedure well with no apparent complications  Complications:no

## 2022-09-04 NOTE — ANESTHESIA PROCEDURE NOTES
Labor Epidural      Patient reassessed immediately prior to procedure    Patient location during procedure: OB  Start Time: 9/4/2022 12:09 PM  Performed By  Anesthesiologist: Loan Brunson DO  Preanesthetic Checklist  Completed: patient identified, IV checked, risks and benefits discussed, surgical consent, monitors and equipment checked, pre-op evaluation and timeout performed  Additional Notes  CSE performed using 25g Rod.    Pt still not comfortable with first epidural.  Aspiration of heme.  Epidural replaced as shown above.  Prep:  Pt Position:sitting  Sterile Tech:cap, gloves, mask and sterile barrier  Prep:DuraPrep  Monitoring:blood pressure monitoring  Epidural Block Procedure:  Approach:midline  Guidance:palpation technique  Location:L4-L5  Needle Type:Tuohy  Needle Gauge:17 G  Loss of Resistance Medium: air  Loss of Resistance: 6cm  Cath Depth at skin:12 cm  Paresthesia: none  Aspiration:negative  Test Dose:negative  Number of Attempts: 1  Post Assessment:  Dressing:occlusive dressing applied and secured with tape  Pt Tolerance:patient tolerated the procedure well with no apparent complications  Complications:no

## 2022-09-05 LAB
BASOPHILS # BLD AUTO: 0.03 10*3/MM3 (ref 0–0.2)
BASOPHILS NFR BLD AUTO: 0.3 % (ref 0–1.5)
DEPRECATED RDW RBC AUTO: 40.6 FL (ref 37–54)
EOSINOPHIL # BLD AUTO: 0.13 10*3/MM3 (ref 0–0.4)
EOSINOPHIL NFR BLD AUTO: 1.5 % (ref 0.3–6.2)
ERYTHROCYTE [DISTWIDTH] IN BLOOD BY AUTOMATED COUNT: 13.6 % (ref 12.3–15.4)
HCT VFR BLD AUTO: 27.4 % (ref 34–46.6)
HGB BLD-MCNC: 9 G/DL (ref 12–15.9)
IMM GRANULOCYTES # BLD AUTO: 0.03 10*3/MM3 (ref 0–0.05)
IMM GRANULOCYTES NFR BLD AUTO: 0.3 % (ref 0–0.5)
LYMPHOCYTES # BLD AUTO: 2.39 10*3/MM3 (ref 0.7–3.1)
LYMPHOCYTES NFR BLD AUTO: 27.8 % (ref 19.6–45.3)
MCH RBC QN AUTO: 27.2 PG (ref 26.6–33)
MCHC RBC AUTO-ENTMCNC: 32.8 G/DL (ref 31.5–35.7)
MCV RBC AUTO: 82.8 FL (ref 79–97)
MONOCYTES # BLD AUTO: 0.74 10*3/MM3 (ref 0.1–0.9)
MONOCYTES NFR BLD AUTO: 8.6 % (ref 5–12)
NEUTROPHILS NFR BLD AUTO: 5.27 10*3/MM3 (ref 1.7–7)
NEUTROPHILS NFR BLD AUTO: 61.5 % (ref 42.7–76)
NRBC BLD AUTO-RTO: 0 /100 WBC (ref 0–0.2)
PLATELET # BLD AUTO: 179 10*3/MM3 (ref 140–450)
PMV BLD AUTO: 11.8 FL (ref 6–12)
RBC # BLD AUTO: 3.31 10*6/MM3 (ref 3.77–5.28)
WBC NRBC COR # BLD: 8.59 10*3/MM3 (ref 3.4–10.8)

## 2022-09-05 PROCEDURE — 85025 COMPLETE CBC W/AUTO DIFF WBC: CPT | Performed by: ADVANCED PRACTICE MIDWIFE

## 2022-09-05 RX ADMIN — FERROUS SULFATE TAB 325 MG (65 MG ELEMENTAL FE) 325 MG: 325 (65 FE) TAB at 20:29

## 2022-09-05 RX ADMIN — IBUPROFEN 600 MG: 600 TABLET ORAL at 08:55

## 2022-09-05 RX ADMIN — FERROUS SULFATE TAB 325 MG (65 MG ELEMENTAL FE) 325 MG: 325 (65 FE) TAB at 08:55

## 2022-09-05 RX ADMIN — IBUPROFEN 600 MG: 600 TABLET ORAL at 22:43

## 2022-09-05 RX ADMIN — DOCUSATE SODIUM 100 MG: 100 CAPSULE, LIQUID FILLED ORAL at 08:55

## 2022-09-05 RX ADMIN — PRENATAL VITAMINS-IRON FUMARATE 27 MG IRON-FOLIC ACID 0.8 MG TABLET 1 TABLET: at 08:55

## 2022-09-05 RX ADMIN — DOCUSATE SODIUM 100 MG: 100 CAPSULE, LIQUID FILLED ORAL at 20:29

## 2022-09-05 NOTE — PROGRESS NOTES
[facility]  Vaginal Delivery Progress Note    Subjective     PPD#1 . Feeling well. Tolerating regular diet. Voiding without difficulty. Had 3 epidurals so she does have some back tenderness but otherwise pain is controlled. Decreasing lochia. VSS. Afebrile. PP h/h showed mild anemia.       Objective     Vital Signs Range for the last 24 hours  Temperature: Temp:  [97.7 °F (36.5 °C)-99.8 °F (37.7 °C)] 98.7 °F (37.1 °C)   Temp Source: Temp src: Oral   BP: BP: (116-159)/(70-91) 129/70   Pulse: Heart Rate:  [] 86   Respirations: Resp:  [16-18] 16   SPO2:     O2 Amount (l/min):     O2 Devices           Physical Exam:  General:  no acute distresss.  Abdomen: Soft, non-tender, fundus firm  Extremities: normal, atraumatic, no cyanosis, and trace edema.       Lab results reviewed:  Yes    Lab Results   Component Value Date    WBC 8.59 2022    HGB 9.0 (L) 2022    HCT 27.4 (L) 2022    MCV 82.8 2022     2022         Assessment & Plan       Normal spontaneous vaginal delivery      Alexandria SENAIT Stock is Day 1  post-partum       Plan:  Continue current care. Start iron supplement.      Callie Barone CNM  2022  10:19 EDT

## 2022-09-06 VITALS
WEIGHT: 223 LBS | HEART RATE: 81 BPM | DIASTOLIC BLOOD PRESSURE: 73 MMHG | RESPIRATION RATE: 18 BRPM | HEIGHT: 67 IN | BODY MASS INDEX: 35 KG/M2 | SYSTOLIC BLOOD PRESSURE: 122 MMHG | TEMPERATURE: 97.9 F

## 2022-09-06 RX ORDER — DOCUSATE SODIUM 100 MG/1
100 CAPSULE, LIQUID FILLED ORAL 2 TIMES DAILY PRN
Qty: 60 CAPSULE | Refills: 1 | Status: SHIPPED | OUTPATIENT
Start: 2022-09-06

## 2022-09-06 RX ORDER — FERROUS SULFATE 325(65) MG
325 TABLET ORAL 2 TIMES DAILY WITH MEALS
Qty: 60 TABLET | Refills: 1 | Status: SHIPPED | OUTPATIENT
Start: 2022-09-06

## 2022-09-06 RX ORDER — IBUPROFEN 600 MG/1
600 TABLET ORAL EVERY 6 HOURS PRN
Qty: 60 TABLET | Refills: 1 | Status: SHIPPED | OUTPATIENT
Start: 2022-09-06

## 2022-09-06 RX ADMIN — IBUPROFEN 600 MG: 600 TABLET ORAL at 05:42

## 2022-09-06 RX ADMIN — DOCUSATE SODIUM 100 MG: 100 CAPSULE, LIQUID FILLED ORAL at 07:16

## 2022-09-06 RX ADMIN — FERROUS SULFATE TAB 325 MG (65 MG ELEMENTAL FE) 325 MG: 325 (65 FE) TAB at 07:16

## 2022-09-06 RX ADMIN — PRENATAL VITAMINS-IRON FUMARATE 27 MG IRON-FOLIC ACID 0.8 MG TABLET 1 TABLET: at 07:16

## 2022-09-06 RX ADMIN — ACETAMINOPHEN 650 MG: 325 TABLET, FILM COATED ORAL at 05:42

## 2022-09-06 NOTE — DISCHARGE SUMMARY
[facility]  Vaginal delivery discharge summary      Patient: Alexandria Stock      MR#:[id number]  Admission  Diagnosis:   Patient Active Problem List   Diagnosis   • Graves disease   • Family history of genetic disease   •  (spontaneous vaginal delivery)   • Postpartum anemia     Discharge Diagnosis:    (spontaneous vaginal delivery)   Postpartum anemia         Date of Admission: 9/3/2022  Date of Discharge:  2022    Procedures:  Vaginal, Spontaneous     2022    12:31 PM      Service:  Obstetrics    Hospital Course:  Patient underwent vaginal delivery and remained in the hospital for 3 days.  During that time she remained afebrile and hemodynamically stable.  On the day of discharge, she was eating, ambulating and voiding without difficulty. She is breastfeeding.      Labs:    Lab Results   Component Value Date    WBC 8.59 2022    HGB 9.0 (L) 2022    HCT 27.4 (L) 2022    MCV 82.8 2022     2022    URICACID 3.4 2022    AST 22 2022    ALT 16 2022     2022     Results from last 7 days   Lab Units 229   ABO TYPING  O   RH TYPING  Positive   ANTIBODY SCREEN  Negative       Discharge Medications     Discharge Medications      New Medications      Instructions Start Date   docusate sodium 100 MG capsule   100 mg, Oral, 2 Times Daily PRN      ferrous sulfate 325 (65 FE) MG tablet   325 mg, Oral, 2 Times Daily With Meals      ibuprofen 600 MG tablet  Commonly known as: ADVIL,MOTRIN   600 mg, Oral, Every 6 Hours PRN         Continue These Medications      Instructions Start Date   prenatal (CLASSIC) vitamin  tablet  Generic drug: prenatal vitamin   Oral, Daily             Discharge Disposition:  To Home    Discharge Condition:  Stable. PP anemia taking ferrous sulfate.     Discharge Diet: Regular    Activity at Discharge: Pelvic rest    Follow-up Appointments  Follow up with LW in 6 weeks.     Julisa Soto CNM  22  08:57 EDT

## 2022-09-06 NOTE — PAYOR COMM NOTE
"Alexandria Delacruz (26 y.o. Female) NOTIFICATION OF DISCHARGE  [id number]             Date of Birth   1995    Social Security Number       Address   [address] [facility] [address]    Home Phone   [phone]    MRN   [id number]       Tenriism   Jewish    Marital Status                               Admission Date   9/3/22    Admission Type   Elective    Admitting Provider   Lita Guillen MD    Attending Provider   Sarah Huggins CNM    Department, Room/Bed   [facility] MOTHER BABY 4A, N410/1       Discharge Date       Discharge Disposition   Home or Self Care    Discharge Destination                               Attending Provider: Sarah Huggins CNM    Allergies: Keflex [Cephalexin]    Isolation: None   Infection: None   Code Status: CPR   Advance Care Planning Activity    Ht: 170.2 cm (67\")   Wt: 101 kg (223 lb)    Admission Cmt: None   Principal Problem: Normal spontaneous vaginal delivery [O80]                 Active Insurance as of 9/3/2022     Primary Coverage     Payor Plan Insurance Group Employer/Plan Group    [place] [id number]     Payor Plan Address Payor Plan Phone Number Payor Plan Fax Number Effective Dates    [employer] [address] [address] [phone]  2/1/2022 - None Entered    [facility] [id number]       Subscriber Name Subscriber Birth Date Member ID       ALEXANDRIA DELACRUZ SENAIT 1995 [id number]                 Emergency Contacts      (Rel.) Home Phone Work Phone Mobile Phone    Isaias Delacruz (Spouse) [phone] -- [phone]            Insurance Information                [place] Phone: [phone]    Subscriber: Alexandria Delacruz Subscriber#: [id number]    Group#: [id number] Precert#: --             Discharge Summary      Julisa Soto CNM at 09/06/22 0857          [facility]  Vaginal delivery discharge summary      Patient: Alexandria SAPP " Montrell      MR#:[id number]  Admission  Diagnosis:   Patient Active Problem List   Diagnosis   • Graves disease   • Family history of genetic disease   •  (spontaneous vaginal delivery)   • Postpartum anemia     Discharge Diagnosis:    (spontaneous vaginal delivery)   Postpartum anemia         Date of Admission: 9/3/2022  Date of Discharge:  2022    Procedures:  Vaginal, Spontaneous     2022    12:31 PM      Service:  Obstetrics    Hospital Course:  Patient underwent vaginal delivery and remained in the hospital for 3 days.  During that time she remained afebrile and hemodynamically stable.  On the day of discharge, she was eating, ambulating and voiding without difficulty. She is breastfeeding.      Labs:    Lab Results   Component Value Date    WBC 8.59 2022    HGB 9.0 (L) 2022    HCT 27.4 (L) 2022    MCV 82.8 2022     2022    URICACID 3.4 2022    AST 22 2022    ALT 16 2022     2022     Results from last 7 days   Lab Units 229   ABO TYPING  O   RH TYPING  Positive   ANTIBODY SCREEN  Negative       Discharge Medications     Discharge Medications      New Medications      Instructions Start Date   docusate sodium 100 MG capsule   100 mg, Oral, 2 Times Daily PRN      ferrous sulfate 325 (65 FE) MG tablet   325 mg, Oral, 2 Times Daily With Meals      ibuprofen 600 MG tablet  Commonly known as: ADVIL,MOTRIN   600 mg, Oral, Every 6 Hours PRN         Continue These Medications      Instructions Start Date   prenatal (CLASSIC) vitamin  tablet  Generic drug: prenatal vitamin   Oral, Daily             Discharge Disposition:  To Home    Discharge Condition:  Stable. PP anemia taking ferrous sulfate.     Discharge Diet: Regular    Activity at Discharge: Pelvic rest    Follow-up Appointments  Follow up with [facility] in 6 weeks.     Julisa Soto CNM  22  08:57 EDT    Electronically signed by Julisa Soto CNM at 22 0857

## 2022-09-06 NOTE — LACTATION NOTE
Mom has a blister to the tip of right nipple and states she has mild cracking.  Bottles through the night to allow healing.  Dr. Hale's Rx given to mom to have signed by OB.  Gels and shells given to mom to assist with comfort and healing.  Encouraged mom to pump when supplementing baby.

## 2022-09-06 NOTE — PLAN OF CARE
Goal Outcome Evaluation:  Plan of Care Reviewed With: patient, spouse        Progress: improving  Outcome Evaluation: up ad julio, asking questions, and resting in between care.

## 2022-09-06 NOTE — PROGRESS NOTES
9/6/2022  PPD #2    Subjective   Alexandria feels well.  Patient describes her lochia as less than menses.  Pain is well controlled  She is breastfeeding.      Objective   Temp: Temp:  [97.9 °F (36.6 °C)-99.2 °F (37.3 °C)] 97.9 °F (36.6 °C) Temp src: Oral   BP: BP: (122-137)/(72-76) 122/73        Pulse: Heart Rate:  [81-94] 81  RR: Resp:  [16-18] 18    General:  No acute distress   Abdomen: Fundus firm and beneath umbilicus   Pelvis: deferred     Lab Results   Component Value Date    WBC 8.59 09/05/2022    HGB 9.0 (L) 09/05/2022    HCT 27.4 (L) 09/05/2022    MCV 82.8 09/05/2022     09/05/2022    ABORH O Rh Positive 06/02/2016    RUBELLAIGGIN Immune 06/02/2016    HEPBSAG NonReactive 06/02/2016    AST 22 09/03/2022    URICACID 3.4 09/03/2022       Assessment  1. PPD# 2 after vaginal delivery  2.   PP anemia 2/2 acute blood loss.     Plan  1. Discharge to home  2. Follow up with [facility]  in 6 weeks  3. Prescriptions for Motrin, ferrous sulfate, and Colace prescribed and advised on weaning.  4. Advised no tampons, intercourse, or tub baths for 6 weeks.       This note has been electronically signed.    Julisa Soto CNM  08:57 EDT  September 6, 2022

## 2022-10-19 ENCOUNTER — OFFICE VISIT (OUTPATIENT)
Dept: ENDOCRINOLOGY | Facility: CLINIC | Age: 27
End: 2022-10-19

## 2022-10-19 ENCOUNTER — LAB (OUTPATIENT)
Dept: LAB | Facility: HOSPITAL | Age: 27
End: 2022-10-19

## 2022-10-19 VITALS
BODY MASS INDEX: 32.49 KG/M2 | DIASTOLIC BLOOD PRESSURE: 80 MMHG | WEIGHT: 207 LBS | OXYGEN SATURATION: 98 % | SYSTOLIC BLOOD PRESSURE: 126 MMHG | HEIGHT: 67 IN | HEART RATE: 93 BPM

## 2022-10-19 DIAGNOSIS — E05.00 GRAVES DISEASE: Primary | ICD-10-CM

## 2022-10-19 PROCEDURE — 99213 OFFICE O/P EST LOW 20 MIN: CPT | Performed by: INTERNAL MEDICINE

## 2022-10-19 NOTE — PROGRESS NOTES
Chief Complaint   Patient presents with   • Graves' Disease     Follow-up     HPI:   Alexandria Stock is a 27 y.o.female who presents to endocrine clinic for f/u evaluation of Graves disease. Last visit 07/05/2022. Her history is as follows:    Interim Events:  - spontaneous vaginal delivery 09/04/2022. No complications. Currently breast feeding  - Overall feeling well post-partum    1) subclinical hyperthyroidism due to Graves disease:    (02/28/2022) Saw pt at 14 weeks gestation. (Fourth pregnancy)  Labs at OB office: (02/12/2022) TSH < 0.015, free T4 1.58 (0.78 - 2.19). [ pt on prenatal vitamin. No high dose biotin supplement]  -Discussed with patient that per American thyroid Association guidelines, treatment with thionamides is not indicated for women with subclinical hyperthyroidism during pregnancy.  -Discussed that if she develops overt hyperthyroidism with moderate to severe symptoms, a thionamide will be prescribed.   -The plan was to monitor her thyroid function test closely and check for TSH receptor antibodies.   (03/28/2022) TSI 4.61 (0 - 0.55)   (07/20/2022) TSI 1.93  - Pt did not require thionamide treatment during her pregnancy    Currently 6 weeks post-partum. Denies palpitations at this time.      Prior History:  1) (2016 - 2017) h/o hyperthyroidism during pregnancy (gestational transient thyrotoxicosis):   - (04/2016) pt was found to have a slightly decreased TSH in 03/2016 and 04/2016 at her PCP's office. Pt was asymptomatic at that time.   3/2016: TSH 0.13 (0.40 - 4.50), free T4 1.1 (normal), free T3 3.9 (normal)  4/2016: TSH 0.21 (0.40 - 4.50)  In early June 2016 she was evaluated for her first pregnancy. She reports feeling very ill during the first trimester. She had symptoms of significant nausea, palpitations, increased anxiety and depression, and significant myalgias.  On 6/24/2016 labs were c/w subclinical hyperthyroidism: TSH <0.004, free T4 normal at 1.32, free T3 normal at 4.3  Because of  her symptoms, she was prescribed methimazole 5 mg TID at 13 weeks. She reported feeling better after the medication was initiated.  - In 12/2016, initial endocrine visit, checked TSH receptor antibody which was normal. I instructed her to stop the methimazole at that time.   - By 03/2017, approx 6 weeks postpartum, TFT's were normal  - In 08/2017, pt had post-partum thyroiditis. TFTs returned to normal a few weeks later per patient.    2) (07/2020 - 09/2020)  h/o intermittent hyperthyroidism: (not pregnant)  -She presented to her PCP in 07/2020 with complaints of mood swings, heat intolerance, palpitations, increased anxiety, and weight gain the 5 to 6 months prior.  Outside Labs: (07/20/2020) - pt not on Biotin or estrogen when labs collected  TSH 0.01, Free T4 1.90 (0.80 - 1.90) ,Free T3 6.9 (2.2 - 4.2), TPO Ab 145, TG Ab 118  TSI% or TSH receptor antibody was not checked at that time  - PCP prescribed metoprolol 25 mg daily and methimazole 5 mg TID in July 2020. I saw pt in 09/2020. Briefly looked at her thyroid gland with in clinic ultrasound which showed a heterogeneous slightly hypoechoic gland with increased vascularity.  No dominant nodule was visualized. Labs in 0/92020 showed a negative TSH Receptor Ab after 2 months of methimazole treatment. Pt stopped her methimazole and metoprolol at some point on her own. Did not follow-up in 01/2021. Pt reported feeling okay after stopping these medications     FMHx: paternal grandmother had hyperthyroidism, paternal aunt hypothyroidism.    Review of Systems   Constitutional: Positive for fatigue. Negative for diaphoresis.        Expected wt loss   HENT: Negative.    Eyes: Negative.    Respiratory: Negative.    Cardiovascular: Negative for palpitations.   Gastrointestinal: Negative for abdominal pain, constipation, diarrhea, nausea and vomiting.   Endocrine: Negative for heat intolerance.   Genitourinary: Negative.    Musculoskeletal: Negative.  Negative for myalgias.  "  Skin: Negative for rash.   Allergic/Immunologic: Negative.    Neurological: Negative.    Hematological: Negative.    Psychiatric/Behavioral: Positive for sleep disturbance.        H/o anxiety       The following portions of the patient's history were reviewed and updated as appropriate: allergies, current medications, past family history, past medical history, past social history, past surgical history and problem list.      /80   Pulse 93   Ht 170.2 cm (67\")   Wt 93.9 kg (207 lb)   LMP 11/25/2021   SpO2 98%   BMI 32.42 kg/m²   Physical Exam   Constitutional: She is oriented to person, place, and time. She appears well-developed. No distress.   HENT:   Head: Normocephalic.   Eyes: Pupils are equal, round, and reactive to light. Conjunctivae are normal.   Neck: No tracheal deviation present. No thyromegaly present.   No palpable thyroid nodules     Cardiovascular: Normal rate, regular rhythm and normal heart sounds.   No murmur heard.  Pulmonary/Chest: Effort normal and breath sounds normal. No respiratory distress.   Abdominal: Soft. Bowel sounds are normal. There is no abdominal tenderness.   Gravid uterus   Lymphadenopathy:     She has no cervical adenopathy.   Neurological: She is alert and oriented to person, place, and time. No cranial nerve deficit.   Skin: Skin is warm and dry. No rash noted. She is not diaphoretic. No erythema.   Psychiatric: Her behavior is normal.   Vitals reviewed.    LABS/IMAGING: outside records reviewed and summarized in HPI  Office Visit on 10/19/2022   Component Date Value Ref Range Status   • TSH 10/19/2022 0.434 (L)  0.450 - 4.500 uIU/mL Final   • Free T4 10/19/2022 1.11  0.82 - 1.77 ng/dL Final   • Thyroid Stimulating Immunoglobulin 10/19/2022 2.12 (H)  0.00 - 0.55 IU/L Final   • WBC 10/19/2022 4.4  3.4 - 10.8 x10E3/uL Final   • RBC 10/19/2022 4.36  3.77 - 5.28 x10E6/uL Final   • Hemoglobin 10/19/2022 11.7  11.1 - 15.9 g/dL Final   • Hematocrit 10/19/2022 35.9  34.0 " - 46.6 % Final   • MCV 10/19/2022 82  79 - 97 fL Final   • MCH 10/19/2022 26.8  26.6 - 33.0 pg Final   • MCHC 10/19/2022 32.6  31.5 - 35.7 g/dL Final   • RDW 10/19/2022 13.0  11.7 - 15.4 % Final   • Platelets 10/19/2022 240  150 - 450 x10E3/uL Final       ASSESSMENT/PLAN:  1) subclinical hyperthyroidism due to Graves disease:  - clinically euthyroid on exam today. Pt states she feels okay overall.   - TFT's checked today. TSH only mildly decreased  - Will monitor for now and start methimazole if she becomes symptomatic  - Reviewed the signs and symptoms of overt hyperthyroidism with patient.  Instructed pt to contact me if she develops any of these symptoms     Pt scheduled to RTC in 6 months    Signed: Evelin Mahmood MD

## 2022-10-21 LAB
ERYTHROCYTE [DISTWIDTH] IN BLOOD BY AUTOMATED COUNT: 13 % (ref 11.7–15.4)
HCT VFR BLD AUTO: 35.9 % (ref 34–46.6)
HGB BLD-MCNC: 11.7 G/DL (ref 11.1–15.9)
MCH RBC QN AUTO: 26.8 PG (ref 26.6–33)
MCHC RBC AUTO-ENTMCNC: 32.6 G/DL (ref 31.5–35.7)
MCV RBC AUTO: 82 FL (ref 79–97)
PLATELET # BLD AUTO: 240 X10E3/UL (ref 150–450)
RBC # BLD AUTO: 4.36 X10E6/UL (ref 3.77–5.28)
T4 FREE SERPL-MCNC: 1.11 NG/DL (ref 0.82–1.77)
TSH SERPL DL<=0.005 MIU/L-ACNC: 0.43 UIU/ML (ref 0.45–4.5)
TSI SER-ACNC: 2.12 IU/L (ref 0–0.55)
WBC # BLD AUTO: 4.4 X10E3/UL (ref 3.4–10.8)

## 2023-10-09 DIAGNOSIS — E05.00 GRAVES DISEASE: Primary | ICD-10-CM

## 2024-04-15 DIAGNOSIS — E05.00 GRAVES DISEASE: Primary | ICD-10-CM

## 2024-04-17 ENCOUNTER — LAB (OUTPATIENT)
Dept: LAB | Facility: HOSPITAL | Age: 29
End: 2024-04-17
Payer: COMMERCIAL

## 2024-04-17 DIAGNOSIS — E05.00 GRAVES DISEASE: ICD-10-CM

## 2024-04-17 LAB
T4 FREE SERPL-MCNC: 1.16 NG/DL (ref 0.93–1.7)
TSH SERPL DL<=0.05 MIU/L-ACNC: 0.07 UIU/ML (ref 0.27–4.2)

## 2024-04-17 PROCEDURE — 84439 ASSAY OF FREE THYROXINE: CPT

## 2024-04-17 PROCEDURE — 84481 FREE ASSAY (FT-3): CPT | Performed by: INTERNAL MEDICINE

## 2024-04-17 PROCEDURE — 84443 ASSAY THYROID STIM HORMONE: CPT

## 2024-04-18 DIAGNOSIS — E05.00 GRAVES DISEASE: Primary | ICD-10-CM

## 2024-04-18 RX ORDER — METHIMAZOLE 5 MG/1
5 TABLET ORAL 3 TIMES WEEKLY
Qty: 45 TABLET | Refills: 1 | Status: SHIPPED | OUTPATIENT
Start: 2024-04-19

## 2024-06-28 ENCOUNTER — LAB (OUTPATIENT)
Dept: LAB | Facility: HOSPITAL | Age: 29
End: 2024-06-28
Payer: COMMERCIAL

## 2024-06-28 DIAGNOSIS — E05.00 GRAVES DISEASE: ICD-10-CM

## 2024-06-28 PROCEDURE — 84443 ASSAY THYROID STIM HORMONE: CPT

## 2024-06-28 PROCEDURE — 84439 ASSAY OF FREE THYROXINE: CPT

## 2024-06-29 LAB
T4 FREE SERPL-MCNC: 1.07 NG/DL (ref 0.92–1.68)
TSH SERPL DL<=0.05 MIU/L-ACNC: 0.68 UIU/ML (ref 0.27–4.2)

## 2024-09-09 ENCOUNTER — OFFICE VISIT (OUTPATIENT)
Dept: ENDOCRINOLOGY | Facility: CLINIC | Age: 29
End: 2024-09-09
Payer: COMMERCIAL

## 2024-09-09 VITALS
HEART RATE: 82 BPM | DIASTOLIC BLOOD PRESSURE: 78 MMHG | HEIGHT: 67 IN | OXYGEN SATURATION: 97 % | BODY MASS INDEX: 31.2 KG/M2 | SYSTOLIC BLOOD PRESSURE: 124 MMHG | WEIGHT: 198.8 LBS

## 2024-09-09 DIAGNOSIS — E05.00 GRAVES DISEASE: Primary | ICD-10-CM

## 2024-09-09 PROCEDURE — 84443 ASSAY THYROID STIM HORMONE: CPT | Performed by: INTERNAL MEDICINE

## 2024-09-09 PROCEDURE — 84439 ASSAY OF FREE THYROXINE: CPT | Performed by: INTERNAL MEDICINE

## 2024-09-09 PROCEDURE — 99213 OFFICE O/P EST LOW 20 MIN: CPT | Performed by: INTERNAL MEDICINE

## 2024-09-09 PROCEDURE — 84445 ASSAY OF TSI GLOBULIN: CPT | Performed by: INTERNAL MEDICINE

## 2024-09-09 PROCEDURE — 83520 IMMUNOASSAY QUANT NOS NONAB: CPT | Performed by: INTERNAL MEDICINE

## 2024-09-09 NOTE — PROGRESS NOTES
Chief Complaint   Patient presents with    Graves' Disease     Follow-up     HPI:   Alexandria Stock is a 28 y.o.female who presents to endocrine clinic for f/u evaluation of Graves disease. Last visit 10/19/2022. Her history is as follows:    Interim Events:  - pt was last seen in clinic in 10/2022. She was unable to attend her f/u appointments  - pt contacted me in 10/2023 with recurrent symptoms concerning for hyperthyroidism. Labs were ordered for 10/2023. She was unable to complete the labs due to lapse in insurance. She was able to complete the labs in 04/2024 which showed subclinical hyperthyroidism. Methimazole was initiated due to symptoms.   - Was out of medication for 2 weeks in early August. She restarted 3 weeks ago.   - Pt is a mother of 6 children. Is also home-schooling her children      1) subclinical hyperthyroidism due to Graves disease:  - pt was last seen in clinic in 10/2022. She was unable to attend her f/u appointments  - pt contacted me in 10/2023 with recurrent symptoms concerning for hyperthyroidism. Labs were ordered for 10/2023. She was unable to complete the labs due to lapse in insurance.   - (04/2024) She was able to complete the labs in 04/2024 which showed subclinical hyperthyroidism. Low dose methimazole was initiated due to symptoms. (04/17/2024) TSH 0.072, free T4 1.16 (0.93 - 1.70), free T3 2.76 (2.00 - 4.40).     Current Dose: methimazole 5 mg, three times weekly  - tolerating medication   - Is weaning breast feeding.    Prior History:  1) (2016 - 2017) h/o hyperthyroidism during pregnancy (gestational transient thyrotoxicosis):   - (04/2016) pt was found to have a slightly decreased TSH in 03/2016 and 04/2016 at her PCP's office. Pt was asymptomatic at that time.   3/2016: TSH 0.13 (0.40 - 4.50), free T4 1.1 (normal), free T3 3.9 (normal)  4/2016: TSH 0.21 (0.40 - 4.50)  In early June 2016 she was evaluated for her first pregnancy. She reports feeling very ill during the first  trimester. She had symptoms of significant nausea, palpitations, increased anxiety and depression, and significant myalgias.  On 6/24/2016 labs were c/w subclinical hyperthyroidism: TSH <0.004, free T4 normal at 1.32, free T3 normal at 4.3  Because of her symptoms, she was prescribed methimazole 5 mg TID at 13 weeks. She reported feeling better after the medication was initiated.  - In 12/2016, initial endocrine visit, checked TSH receptor antibody which was normal. I instructed her to stop the methimazole at that time.   - By 03/2017, approx 6 weeks postpartum, TFT's were normal  - In 08/2017, pt had post-partum thyroiditis. TFTs returned to normal a few weeks later per patient.    2) (07/2020 - 09/2020)  h/o intermittent hyperthyroidism: (not pregnant)  -She presented to her PCP in 07/2020 with complaints of mood swings, heat intolerance, palpitations, increased anxiety, and weight gain the 5 to 6 months prior.  Outside Labs: (07/20/2020) - pt not on Biotin or estrogen when labs collected  TSH 0.01, Free T4 1.90 (0.80 - 1.90) ,Free T3 6.9 (2.2 - 4.2), TPO Ab 145, TG Ab 118  TSI% or TSH receptor antibody was not checked at that time  - PCP prescribed metoprolol 25 mg daily and methimazole 5 mg TID in July 2020. I saw pt in 09/2020. Briefly looked at her thyroid gland with in clinic ultrasound which showed a heterogeneous slightly hypoechoic gland with increased vascularity.  No dominant nodule was visualized. Labs in 0/92020 showed a negative TSH Receptor Ab after 2 months of methimazole treatment. Pt stopped her methimazole and metoprolol at some point on her own. Did not follow-up in 01/2021. Pt reported feeling okay after stopping these medications     3) Graves disease during pregnancy (02/2022 - 10/2022)  (02/28/2022) Saw pt at 14 weeks gestation. (Fourth pregnancy)  Labs at OB office: (02/12/2022) TSH < 0.015, free T4 1.58 (0.78 - 2.19). [ pt on prenatal vitamin. No high dose biotin supplement]  -Discussed  "with patient that per American thyroid Association guidelines, treatment with thionamides is not indicated for women with subclinical hyperthyroidism during pregnancy.  -Discussed that if she develops overt hyperthyroidism with moderate to severe symptoms, a thionamide will be prescribed.   -The plan was to monitor her thyroid function test closely and check for TSH receptor antibodies.   (03/28/2022) TSI 4.61 (0 - 0.55)   (07/20/2022) TSI 1.93  - Pt did not require thionamide treatment during her pregnancy  (10/2022) 6 weeks post-partum pt's labs were stable. Pt remained off medication.  .   FMHx: paternal grandmother had hyperthyroidism, paternal aunt hypothyroidism.    Review of Systems   Constitutional:  Positive for fatigue. Negative for diaphoresis.        Wt loss   HENT: Negative.     Eyes: Negative.    Respiratory: Negative.     Cardiovascular:  Negative for palpitations.   Gastrointestinal:  Negative for abdominal pain, constipation, diarrhea, nausea and vomiting.   Endocrine: Negative for heat intolerance.   Genitourinary: Negative.    Musculoskeletal: Negative.  Negative for myalgias.   Skin:  Negative for rash.   Allergic/Immunologic: Negative.    Neurological: Negative.    Hematological: Negative.    Psychiatric/Behavioral:  Positive for sleep disturbance.         H/o anxiety     The following portions of the patient's history were reviewed and updated as appropriate: allergies, current medications, past family history, past medical history, past social history, past surgical history and problem list.    /78 (BP Location: Left arm, Patient Position: Sitting, Cuff Size: Adult)   Pulse 82   Ht 170.2 cm (67\")   Wt 90.2 kg (198 lb 12.8 oz)   SpO2 97%   Breastfeeding Yes   BMI 31.14 kg/m²   Physical Exam   Constitutional: She is oriented to person, place, and time. She appears well-developed. No distress.   HENT:   Head: Normocephalic.   Eyes: Pupils are equal, round, and reactive to light. " Conjunctivae are normal.   Neck: No tracheal deviation present. No thyromegaly present.   No palpable thyroid nodules     Cardiovascular: Normal rate, regular rhythm and normal heart sounds.   No murmur heard.  Pulmonary/Chest: Effort normal and breath sounds normal. No respiratory distress.   Abdominal: Soft. Bowel sounds are normal. There is no abdominal tenderness.   Lymphadenopathy:     She has no cervical adenopathy.   Neurological: She is alert and oriented to person, place, and time. No cranial nerve deficit.   Skin: Skin is warm and dry. No rash noted. She is not diaphoretic. No erythema.   Psychiatric: Her behavior is normal.   Vitals reviewed.    LABS/IMAGING: outside records reviewed and summarized in HPI  Office Visit on 09/09/2024   Component Date Value Ref Range Status    TSH 09/09/2024 0.751  0.270 - 4.200 uIU/mL Final    Free T4 09/09/2024 1.03  0.92 - 1.68 ng/dL Final    Thyroid Stimulating Immunoglobulin 09/09/2024 1.93 (H)  0.00 - 0.55 IU/L Final    Thyrotropin Receptor Antibody 09/09/2024 <1.10  0.00 - 1.75 IU/L Final       ASSESSMENT/PLAN:  1) subclinical hyperthyroidism due to Graves disease:  - clinically euthyroid on exam today. Pt states she feels okay overall.   - TFT's checked today and are normal   - TSI is still elevated. Therefore will continue methimazole treatment. Overall plan will be to continue for 12-18 months, approximately. (Methimazole started 04/2024)  - Continue methimazole 5 mg, 3 times per week.     Pt scheduled to RTC in 6 months    Electronically Signed: Evelin Mahmood MD

## 2024-09-10 LAB
T4 FREE SERPL-MCNC: 1.03 NG/DL (ref 0.92–1.68)
TSH SERPL DL<=0.05 MIU/L-ACNC: 0.75 UIU/ML (ref 0.27–4.2)

## 2024-09-11 LAB — TSH RECEP AB SER-ACNC: <1.1 IU/L (ref 0–1.75)

## 2024-09-12 LAB — TSI SER-ACNC: 1.93 IU/L (ref 0–0.55)

## 2024-09-12 RX ORDER — METHIMAZOLE 5 MG/1
5 TABLET ORAL 3 TIMES WEEKLY
Qty: 45 TABLET | Refills: 1 | Status: SHIPPED | OUTPATIENT
Start: 2024-09-13

## 2024-11-17 NOTE — TELEPHONE ENCOUNTER
Spoke to patient about lab results from 08/08/2017. Pt is breastfeeding. Appears to have presumed postpartum thyroiditis. Will start propranolol 10 mg BID for symptom control. Pt to repeat labs in 6-8 weeks locally. Will determine f/u after labs reviewed.   Evelin Mahmood MD      
Not Applicable

## 2025-03-10 ENCOUNTER — OFFICE VISIT (OUTPATIENT)
Dept: ENDOCRINOLOGY | Facility: CLINIC | Age: 30
End: 2025-03-10
Payer: COMMERCIAL

## 2025-03-10 VITALS
DIASTOLIC BLOOD PRESSURE: 78 MMHG | HEIGHT: 67 IN | BODY MASS INDEX: 29.91 KG/M2 | HEART RATE: 80 BPM | WEIGHT: 190.6 LBS | OXYGEN SATURATION: 98 % | SYSTOLIC BLOOD PRESSURE: 122 MMHG

## 2025-03-10 DIAGNOSIS — E05.00 GRAVES DISEASE: Primary | ICD-10-CM

## 2025-03-10 DIAGNOSIS — G44.89 FOOD SENSITIVITY HEADACHE: ICD-10-CM

## 2025-03-10 PROCEDURE — 84443 ASSAY THYROID STIM HORMONE: CPT | Performed by: INTERNAL MEDICINE

## 2025-03-10 PROCEDURE — 84445 ASSAY OF TSI GLOBULIN: CPT | Performed by: INTERNAL MEDICINE

## 2025-03-10 PROCEDURE — 82784 ASSAY IGA/IGD/IGG/IGM EACH: CPT | Performed by: INTERNAL MEDICINE

## 2025-03-10 PROCEDURE — 99213 OFFICE O/P EST LOW 20 MIN: CPT | Performed by: INTERNAL MEDICINE

## 2025-03-10 PROCEDURE — 86364 TISS TRNSGLTMNASE EA IG CLAS: CPT | Performed by: INTERNAL MEDICINE

## 2025-03-10 PROCEDURE — 84480 ASSAY TRIIODOTHYRONINE (T3): CPT | Performed by: INTERNAL MEDICINE

## 2025-03-10 PROCEDURE — 84439 ASSAY OF FREE THYROXINE: CPT | Performed by: INTERNAL MEDICINE

## 2025-03-10 PROCEDURE — 86231 EMA EACH IG CLASS: CPT | Performed by: INTERNAL MEDICINE

## 2025-03-10 NOTE — PROGRESS NOTES
Chief Complaint   Patient presents with    Graves' Disease     Follow-up     HPI:   Alexandria Stock is a 29 y.o.female who presents to endocrine clinic for f/u evaluation of Graves disease. Last visit 09/09/2024. Her history is as follows:    Interim Events:  - Pt reports overall feeling well  - Has been out of methimazole since 02/2025.   - Is currently following a gluten free / dairy free diet  - Is having an intermittent rash. Not clear if food related    1) Graves disease:  - pt was last seen in clinic in 10/2022. She was unable to attend her f/u appointments  - pt contacted me in 10/2023 with recurrent symptoms concerning for hyperthyroidism. Labs were ordered for 10/2023. She was unable to complete the labs due to lapse in insurance.   - (04/2024) She was able to complete the labs in 04/2024 which showed subclinical hyperthyroidism. Low dose methimazole was initiated due to symptoms. (04/17/2024) TSH 0.072, free T4 1.16 (0.93 - 1.70), free T3 2.76 (2.00 - 4.40).     Current Dose: none, pt ran out of methimazole 5 mg (M, W, F) in 02/2025    Prior History:  1) (2016 - 2017) h/o hyperthyroidism during pregnancy (gestational transient thyrotoxicosis):   - (04/2016) pt was found to have a slightly decreased TSH in 03/2016 and 04/2016 at her PCP's office. Pt was asymptomatic at that time.   3/2016: TSH 0.13 (0.40 - 4.50), free T4 1.1 (normal), free T3 3.9 (normal)  4/2016: TSH 0.21 (0.40 - 4.50)  In early June 2016 she was evaluated for her first pregnancy. She reports feeling very ill during the first trimester. She had symptoms of significant nausea, palpitations, increased anxiety and depression, and significant myalgias.  On 6/24/2016 labs were c/w subclinical hyperthyroidism: TSH <0.004, free T4 normal at 1.32, free T3 normal at 4.3  Because of her symptoms, she was prescribed methimazole 5 mg TID at 13 weeks. She reported feeling better after the medication was initiated.  - In 12/2016, initial endocrine visit,  checked TSH receptor antibody which was normal. I instructed her to stop the methimazole at that time.   - By 03/2017, approx 6 weeks postpartum, TFT's were normal  - In 08/2017, pt had post-partum thyroiditis. TFTs returned to normal a few weeks later per patient.    2) (07/2020 - 09/2020)  h/o intermittent hyperthyroidism: (not pregnant)  -She presented to her PCP in 07/2020 with complaints of mood swings, heat intolerance, palpitations, increased anxiety, and weight gain the 5 to 6 months prior.  Outside Labs: (07/20/2020) - pt not on Biotin or estrogen when labs collected  TSH 0.01, Free T4 1.90 (0.80 - 1.90) ,Free T3 6.9 (2.2 - 4.2), TPO Ab 145, TG Ab 118  TSI% or TSH receptor antibody was not checked at that time  - PCP prescribed metoprolol 25 mg daily and methimazole 5 mg TID in July 2020. I saw pt in 09/2020. Briefly looked at her thyroid gland with in clinic ultrasound which showed a heterogeneous slightly hypoechoic gland with increased vascularity.  No dominant nodule was visualized. Labs in 0/92020 showed a negative TSH Receptor Ab after 2 months of methimazole treatment. Pt stopped her methimazole and metoprolol at some point on her own. Did not follow-up in 01/2021. Pt reported feeling okay after stopping these medications     3) Graves disease during pregnancy (02/2022 - 10/2022)  (02/28/2022) Saw pt at 14 weeks gestation. (Fourth pregnancy)  Labs at OB office: (02/12/2022) TSH < 0.015, free T4 1.58 (0.78 - 2.19). [ pt on prenatal vitamin. No high dose biotin supplement]  -Discussed with patient that per American thyroid Association guidelines, treatment with thionamides is not indicated for women with subclinical hyperthyroidism during pregnancy.  -Discussed that if she develops overt hyperthyroidism with moderate to severe symptoms, a thionamide will be prescribed.   -The plan was to monitor her thyroid function test closely and check for TSH receptor antibodies.   (03/28/2022) TSI 4.61 (0 - 0.55)  "  (07/20/2022) TSI 1.93  - Pt did not require thionamide treatment during her pregnancy  (10/2022) 6 weeks post-partum pt's labs were stable. Pt remained off medication.  .   FMHx: paternal grandmother had hyperthyroidism, paternal aunt hypothyroidism.    Review of Systems   Constitutional:  Negative for diaphoresis and fatigue.        Wt loss with dietary changes   Eyes: Negative.    Respiratory: Negative.     Cardiovascular:  Negative for palpitations.   Gastrointestinal:  Negative for abdominal pain, constipation, diarrhea, nausea and vomiting.   Endocrine: Negative for heat intolerance.   Genitourinary: Negative.    Musculoskeletal: Negative.  Negative for myalgias.   Skin:  Positive for rash.   Allergic/Immunologic: Negative.    Neurological:  Positive for headaches.   Hematological: Negative.    Psychiatric/Behavioral:  Positive for sleep disturbance.         H/o anxiety     The following portions of the patient's history were reviewed and updated as appropriate: allergies, current medications, past family history, past medical history, past social history, past surgical history and problem list.      /78 (BP Location: Right arm, Patient Position: Sitting, Cuff Size: Adult)   Pulse 80   Ht 170.2 cm (67\")   Wt 86.5 kg (190 lb 9.6 oz)   SpO2 98%   Breastfeeding No   BMI 29.85 kg/m²   Physical Exam   Constitutional: She is oriented to person, place, and time. She appears well-developed. No distress.   HENT:   Head: Normocephalic.   Eyes: Pupils are equal, round, and reactive to light. Conjunctivae are normal.   Neck: No tracheal deviation present. No thyromegaly present.   No palpable thyroid nodules     Cardiovascular: Normal rate, regular rhythm and normal heart sounds.   No murmur heard.  Pulmonary/Chest: Effort normal and breath sounds normal. No respiratory distress.   Abdominal: Soft. Bowel sounds are normal. There is no abdominal tenderness.   Lymphadenopathy:     She has no cervical " adenopathy.   Neurological: She is alert and oriented to person, place, and time. No cranial nerve deficit.   Skin: Skin is warm and dry. No rash (rash not present today) noted. She is not diaphoretic. No erythema.   Psychiatric: Her behavior is normal.   Vitals reviewed.    LABS/IMAGING: outside records reviewed and summarized in HPI  Office Visit on 03/10/2025   Component Date Value Ref Range Status    TSH 03/10/2025 1.120  0.270 - 4.200 uIU/mL Final    Free T4 03/10/2025 1.24  0.92 - 1.68 ng/dL Final    T3, Total 03/10/2025 135.0  80.0 - 200.0 ng/dl Final    Thyroid Stimulating Immunoglobulin 03/10/2025 1.51 (H)  0.00 - 0.55 IU/L Final    Endomysial IgA 03/10/2025 Negative  Negative Final    Tissue Transglutaminase IgA 03/10/2025 <2  0 - 3 U/mL Final                                  Negative        0 -  3                                Weak Positive   4 - 10                                Positive           >10   Tissue Transglutaminase (tTG) has been identified   as the endomysial antigen.  Studies have demonstr-   ated that endomysial IgA antibodies have over 99%   specificity for gluten sensitive enteropathy.    IgA 03/10/2025 110  87 - 352 mg/dL Final       ASSESSMENT/PLAN:  1) Graves disease:  - clinically euthyroid on exam today. Pt states she feels okay overall.   - TFT's checked today and are normal   - TSI is still mildly elevated. Pt ran out of methimazole in 02/2025. Pt to remain off methimazole for now.  Discussed that she may develop hyperthyroidism again given her mildly elevated Graves' antibody.  Patient to contact me if she develops any concerning symptoms.    2) Food sensitivity headache:  -Celiac panel checked today and was negative    Pt scheduled to RTC in 6 months    Electronically Signed: Evelin Mahmood MD

## 2025-03-11 LAB
T3 SERPL-MCNC: 135 NG/DL (ref 80–200)
T4 FREE SERPL-MCNC: 1.24 NG/DL (ref 0.92–1.68)
TSH SERPL DL<=0.05 MIU/L-ACNC: 1.12 UIU/ML (ref 0.27–4.2)

## 2025-03-13 LAB
ENDOMYSIUM IGA SER QL: NEGATIVE
IGA SERPL-MCNC: 110 MG/DL (ref 87–352)
TTG IGA SER-ACNC: <2 U/ML (ref 0–3)

## 2025-03-14 LAB — TSI SER-ACNC: 1.51 IU/L (ref 0–0.55)
